# Patient Record
Sex: FEMALE | Race: WHITE | NOT HISPANIC OR LATINO | Employment: OTHER | URBAN - METROPOLITAN AREA
[De-identification: names, ages, dates, MRNs, and addresses within clinical notes are randomized per-mention and may not be internally consistent; named-entity substitution may affect disease eponyms.]

---

## 2017-02-21 ENCOUNTER — OFFICE VISIT - HEALTHEAST (OUTPATIENT)
Dept: FAMILY MEDICINE | Facility: CLINIC | Age: 72
End: 2017-02-21

## 2017-02-21 DIAGNOSIS — J06.9 VIRAL URI WITH COUGH: ICD-10-CM

## 2017-02-28 ENCOUNTER — COMMUNICATION - HEALTHEAST (OUTPATIENT)
Dept: SCHEDULING | Facility: CLINIC | Age: 72
End: 2017-02-28

## 2017-03-09 ENCOUNTER — COMMUNICATION - HEALTHEAST (OUTPATIENT)
Dept: INTERNAL MEDICINE | Facility: CLINIC | Age: 72
End: 2017-03-09

## 2017-03-09 DIAGNOSIS — I10 HTN (HYPERTENSION): ICD-10-CM

## 2017-05-02 ENCOUNTER — COMMUNICATION - HEALTHEAST (OUTPATIENT)
Dept: INTERNAL MEDICINE | Facility: CLINIC | Age: 72
End: 2017-05-02

## 2017-05-23 ENCOUNTER — RECORDS - HEALTHEAST (OUTPATIENT)
Dept: ADMINISTRATIVE | Facility: OTHER | Age: 72
End: 2017-05-23

## 2017-05-24 ENCOUNTER — OFFICE VISIT - HEALTHEAST (OUTPATIENT)
Dept: INTERNAL MEDICINE | Facility: CLINIC | Age: 72
End: 2017-05-24

## 2017-05-24 DIAGNOSIS — E78.5 HYPERLIPIDEMIA: ICD-10-CM

## 2017-05-24 DIAGNOSIS — Z00.00 MEDICARE ANNUAL WELLNESS VISIT, SUBSEQUENT: ICD-10-CM

## 2017-05-24 DIAGNOSIS — Z12.11 SCREENING FOR COLON CANCER: ICD-10-CM

## 2017-05-24 DIAGNOSIS — I10 ESSENTIAL HYPERTENSION: ICD-10-CM

## 2017-05-24 DIAGNOSIS — Z12.31 SCREENING MAMMOGRAM, ENCOUNTER FOR: ICD-10-CM

## 2017-05-24 LAB
CHOLEST SERPL-MCNC: 284 MG/DL
FASTING STATUS PATIENT QL REPORTED: ABNORMAL
HDLC SERPL-MCNC: 58 MG/DL
LDLC SERPL CALC-MCNC: 181 MG/DL
TRIGL SERPL-MCNC: 224 MG/DL

## 2017-05-24 ASSESSMENT — MIFFLIN-ST. JEOR: SCORE: 1275.31

## 2017-05-25 ENCOUNTER — COMMUNICATION - HEALTHEAST (OUTPATIENT)
Dept: INTERNAL MEDICINE | Facility: CLINIC | Age: 72
End: 2017-05-25

## 2017-05-30 ENCOUNTER — COMMUNICATION - HEALTHEAST (OUTPATIENT)
Dept: INTERNAL MEDICINE | Facility: CLINIC | Age: 72
End: 2017-05-30

## 2017-05-30 DIAGNOSIS — I10 HTN (HYPERTENSION): ICD-10-CM

## 2017-06-06 ENCOUNTER — COMMUNICATION - HEALTHEAST (OUTPATIENT)
Dept: INTERNAL MEDICINE | Facility: CLINIC | Age: 72
End: 2017-06-06

## 2017-06-06 DIAGNOSIS — I10 HTN (HYPERTENSION): ICD-10-CM

## 2017-07-27 ENCOUNTER — OFFICE VISIT - HEALTHEAST (OUTPATIENT)
Dept: INTERNAL MEDICINE | Facility: CLINIC | Age: 72
End: 2017-07-27

## 2017-07-27 DIAGNOSIS — M72.2 PLANTAR FASCIITIS: ICD-10-CM

## 2017-07-27 DIAGNOSIS — Z77.22 TOBACCO SMOKE EXPOSURE: ICD-10-CM

## 2017-07-27 DIAGNOSIS — R05.9 COUGH: ICD-10-CM

## 2017-08-24 ENCOUNTER — COMMUNICATION - HEALTHEAST (OUTPATIENT)
Dept: INTERNAL MEDICINE | Facility: CLINIC | Age: 72
End: 2017-08-24

## 2017-10-24 ENCOUNTER — AMBULATORY - HEALTHEAST (OUTPATIENT)
Dept: NURSING | Facility: CLINIC | Age: 72
End: 2017-10-24

## 2018-05-17 ENCOUNTER — COMMUNICATION - HEALTHEAST (OUTPATIENT)
Dept: SCHEDULING | Facility: CLINIC | Age: 73
End: 2018-05-17

## 2018-05-21 ENCOUNTER — COMMUNICATION - HEALTHEAST (OUTPATIENT)
Dept: INTERNAL MEDICINE | Facility: CLINIC | Age: 73
End: 2018-05-21

## 2018-05-21 DIAGNOSIS — I10 HTN (HYPERTENSION): ICD-10-CM

## 2018-06-08 ENCOUNTER — RECORDS - HEALTHEAST (OUTPATIENT)
Dept: ADMINISTRATIVE | Facility: OTHER | Age: 73
End: 2018-06-08

## 2018-08-20 ENCOUNTER — COMMUNICATION - HEALTHEAST (OUTPATIENT)
Dept: INTERNAL MEDICINE | Facility: CLINIC | Age: 73
End: 2018-08-20

## 2018-08-20 DIAGNOSIS — I10 HTN (HYPERTENSION): ICD-10-CM

## 2018-10-13 ENCOUNTER — AMBULATORY - HEALTHEAST (OUTPATIENT)
Dept: NURSING | Facility: CLINIC | Age: 73
End: 2018-10-13

## 2018-10-13 DIAGNOSIS — Z23 FLU VACCINE NEED: ICD-10-CM

## 2018-11-16 ENCOUNTER — COMMUNICATION - HEALTHEAST (OUTPATIENT)
Dept: INTERNAL MEDICINE | Facility: CLINIC | Age: 73
End: 2018-11-16

## 2018-11-16 DIAGNOSIS — I10 HTN (HYPERTENSION): ICD-10-CM

## 2019-02-14 ENCOUNTER — COMMUNICATION - HEALTHEAST (OUTPATIENT)
Dept: INTERNAL MEDICINE | Facility: CLINIC | Age: 74
End: 2019-02-14

## 2019-02-14 DIAGNOSIS — I10 HTN (HYPERTENSION): ICD-10-CM

## 2019-03-18 ENCOUNTER — COMMUNICATION - HEALTHEAST (OUTPATIENT)
Dept: INTERNAL MEDICINE | Facility: CLINIC | Age: 74
End: 2019-03-18

## 2019-03-18 DIAGNOSIS — I10 HTN (HYPERTENSION): ICD-10-CM

## 2019-04-15 ENCOUNTER — COMMUNICATION - HEALTHEAST (OUTPATIENT)
Dept: INTERNAL MEDICINE | Facility: CLINIC | Age: 74
End: 2019-04-15

## 2019-04-15 DIAGNOSIS — I10 HTN (HYPERTENSION): ICD-10-CM

## 2019-05-15 ENCOUNTER — COMMUNICATION - HEALTHEAST (OUTPATIENT)
Dept: INTERNAL MEDICINE | Facility: CLINIC | Age: 74
End: 2019-05-15

## 2019-05-15 DIAGNOSIS — I10 HTN (HYPERTENSION): ICD-10-CM

## 2019-05-24 ENCOUNTER — OFFICE VISIT - HEALTHEAST (OUTPATIENT)
Dept: INTERNAL MEDICINE | Facility: CLINIC | Age: 74
End: 2019-05-24

## 2019-05-24 DIAGNOSIS — Z12.11 SCREEN FOR COLON CANCER: ICD-10-CM

## 2019-05-24 DIAGNOSIS — I10 HTN (HYPERTENSION): ICD-10-CM

## 2019-05-24 DIAGNOSIS — Z12.31 VISIT FOR SCREENING MAMMOGRAM: ICD-10-CM

## 2019-06-11 ENCOUNTER — OFFICE VISIT - HEALTHEAST (OUTPATIENT)
Dept: INTERNAL MEDICINE | Facility: CLINIC | Age: 74
End: 2019-06-11

## 2019-06-11 DIAGNOSIS — E78.5 HYPERLIPIDEMIA, UNSPECIFIED HYPERLIPIDEMIA TYPE: ICD-10-CM

## 2019-06-11 DIAGNOSIS — I10 ESSENTIAL HYPERTENSION: ICD-10-CM

## 2019-06-11 LAB
ALBUMIN SERPL-MCNC: 3.3 G/DL (ref 3.5–5)
ALP SERPL-CCNC: 73 U/L (ref 45–120)
ALT SERPL W P-5'-P-CCNC: <9 U/L (ref 0–45)
ANION GAP SERPL CALCULATED.3IONS-SCNC: 10 MMOL/L (ref 5–18)
AST SERPL W P-5'-P-CCNC: 12 U/L (ref 0–40)
BILIRUB SERPL-MCNC: 1 MG/DL (ref 0–1)
BUN SERPL-MCNC: 27 MG/DL (ref 8–28)
CALCIUM SERPL-MCNC: 10 MG/DL (ref 8.5–10.5)
CHLORIDE BLD-SCNC: 103 MMOL/L (ref 98–107)
CHOLEST SERPL-MCNC: 257 MG/DL
CO2 SERPL-SCNC: 29 MMOL/L (ref 22–31)
CREAT SERPL-MCNC: 0.83 MG/DL (ref 0.6–1.1)
ERYTHROCYTE [DISTWIDTH] IN BLOOD BY AUTOMATED COUNT: 12.5 % (ref 11–14.5)
FASTING STATUS PATIENT QL REPORTED: YES
GFR SERPL CREATININE-BSD FRML MDRD: >60 ML/MIN/1.73M2
GLUCOSE BLD-MCNC: 84 MG/DL (ref 70–125)
HCT VFR BLD AUTO: 40.4 % (ref 35–47)
HDLC SERPL-MCNC: 56 MG/DL
HGB BLD-MCNC: 13.7 G/DL (ref 12–16)
LDLC SERPL CALC-MCNC: 176 MG/DL
MCH RBC QN AUTO: 29.7 PG (ref 27–34)
MCHC RBC AUTO-ENTMCNC: 33.9 G/DL (ref 32–36)
MCV RBC AUTO: 87 FL (ref 80–100)
PLATELET # BLD AUTO: 283 THOU/UL (ref 140–440)
PMV BLD AUTO: 6.1 FL (ref 7–10)
POTASSIUM BLD-SCNC: 3.5 MMOL/L (ref 3.5–5)
PROT SERPL-MCNC: 7.2 G/DL (ref 6–8)
RBC # BLD AUTO: 4.62 MILL/UL (ref 3.8–5.4)
SODIUM SERPL-SCNC: 142 MMOL/L (ref 136–145)
TRIGL SERPL-MCNC: 125 MG/DL
WBC: 5.1 THOU/UL (ref 4–11)

## 2019-06-12 ENCOUNTER — COMMUNICATION - HEALTHEAST (OUTPATIENT)
Dept: INTERNAL MEDICINE | Facility: CLINIC | Age: 74
End: 2019-06-12

## 2019-06-12 DIAGNOSIS — I10 HTN (HYPERTENSION): ICD-10-CM

## 2019-07-24 ENCOUNTER — OFFICE VISIT - HEALTHEAST (OUTPATIENT)
Dept: INTERNAL MEDICINE | Facility: CLINIC | Age: 74
End: 2019-07-24

## 2019-07-24 DIAGNOSIS — I10 ESSENTIAL HYPERTENSION: ICD-10-CM

## 2019-07-24 DIAGNOSIS — E78.5 HYPERLIPIDEMIA, UNSPECIFIED HYPERLIPIDEMIA TYPE: ICD-10-CM

## 2019-07-25 ENCOUNTER — COMMUNICATION - HEALTHEAST (OUTPATIENT)
Dept: INTERNAL MEDICINE | Facility: CLINIC | Age: 74
End: 2019-07-25

## 2019-08-21 ENCOUNTER — COMMUNICATION - HEALTHEAST (OUTPATIENT)
Dept: INTERNAL MEDICINE | Facility: CLINIC | Age: 74
End: 2019-08-21

## 2019-10-15 ENCOUNTER — OFFICE VISIT - HEALTHEAST (OUTPATIENT)
Dept: FAMILY MEDICINE | Facility: CLINIC | Age: 74
End: 2019-10-15

## 2019-10-15 DIAGNOSIS — I10 ESSENTIAL HYPERTENSION: ICD-10-CM

## 2019-10-15 DIAGNOSIS — Z00.00 ROUTINE ADULT HEALTH MAINTENANCE: ICD-10-CM

## 2019-10-15 DIAGNOSIS — M48.07 SPINAL STENOSIS OF LUMBOSACRAL REGION: ICD-10-CM

## 2019-10-15 DIAGNOSIS — E78.2 MIXED HYPERLIPIDEMIA: ICD-10-CM

## 2019-10-15 ASSESSMENT — MIFFLIN-ST. JEOR: SCORE: 1226.27

## 2020-06-10 ENCOUNTER — COMMUNICATION - HEALTHEAST (OUTPATIENT)
Dept: INTERNAL MEDICINE | Facility: CLINIC | Age: 75
End: 2020-06-10

## 2020-06-10 DIAGNOSIS — I10 HTN (HYPERTENSION): ICD-10-CM

## 2020-06-18 ENCOUNTER — OFFICE VISIT - HEALTHEAST (OUTPATIENT)
Dept: FAMILY MEDICINE | Facility: CLINIC | Age: 75
End: 2020-06-18

## 2020-06-18 DIAGNOSIS — E78.2 MIXED HYPERLIPIDEMIA: ICD-10-CM

## 2020-06-18 DIAGNOSIS — I10 HTN (HYPERTENSION): ICD-10-CM

## 2021-02-05 ENCOUNTER — AMBULATORY - HEALTHEAST (OUTPATIENT)
Dept: NURSING | Facility: CLINIC | Age: 76
End: 2021-02-05

## 2021-02-26 ENCOUNTER — AMBULATORY - HEALTHEAST (OUTPATIENT)
Dept: NURSING | Facility: CLINIC | Age: 76
End: 2021-02-26

## 2021-03-03 ENCOUNTER — COMMUNICATION - HEALTHEAST (OUTPATIENT)
Dept: FAMILY MEDICINE | Facility: CLINIC | Age: 76
End: 2021-03-03

## 2021-03-03 DIAGNOSIS — I10 HTN (HYPERTENSION): ICD-10-CM

## 2021-03-03 DIAGNOSIS — E78.2 MIXED HYPERLIPIDEMIA: ICD-10-CM

## 2021-05-03 ENCOUNTER — OFFICE VISIT - HEALTHEAST (OUTPATIENT)
Dept: FAMILY MEDICINE | Facility: CLINIC | Age: 76
End: 2021-05-03

## 2021-05-03 DIAGNOSIS — Z13.6 SCREENING FOR CARDIOVASCULAR CONDITION: ICD-10-CM

## 2021-05-03 DIAGNOSIS — I10 ESSENTIAL HYPERTENSION: ICD-10-CM

## 2021-05-03 DIAGNOSIS — E78.2 MIXED HYPERLIPIDEMIA: ICD-10-CM

## 2021-05-03 LAB
ALBUMIN SERPL-MCNC: 3.5 G/DL (ref 3.5–5)
ALP SERPL-CCNC: 84 U/L (ref 45–120)
ALT SERPL W P-5'-P-CCNC: 24 U/L (ref 0–45)
ANION GAP SERPL CALCULATED.3IONS-SCNC: 10 MMOL/L (ref 5–18)
AST SERPL W P-5'-P-CCNC: 17 U/L (ref 0–40)
BILIRUB SERPL-MCNC: 1.3 MG/DL (ref 0–1)
BUN SERPL-MCNC: 24 MG/DL (ref 8–28)
CALCIUM SERPL-MCNC: 9.6 MG/DL (ref 8.5–10.5)
CHLORIDE BLD-SCNC: 101 MMOL/L (ref 98–107)
CHOLEST SERPL-MCNC: 172 MG/DL
CO2 SERPL-SCNC: 29 MMOL/L (ref 22–31)
CREAT SERPL-MCNC: 0.77 MG/DL (ref 0.6–1.1)
ERYTHROCYTE [DISTWIDTH] IN BLOOD BY AUTOMATED COUNT: 13 % (ref 11–14.5)
FASTING STATUS PATIENT QL REPORTED: YES
GFR SERPL CREATININE-BSD FRML MDRD: >60 ML/MIN/1.73M2
GLUCOSE BLD-MCNC: 84 MG/DL (ref 70–125)
HCT VFR BLD AUTO: 40.3 % (ref 35–47)
HDLC SERPL-MCNC: 61 MG/DL
HGB BLD-MCNC: 13.3 G/DL (ref 12–16)
LDLC SERPL CALC-MCNC: 93 MG/DL
MCH RBC QN AUTO: 29.7 PG (ref 27–34)
MCHC RBC AUTO-ENTMCNC: 33 G/DL (ref 32–36)
MCV RBC AUTO: 90 FL (ref 80–100)
PLATELET # BLD AUTO: 282 THOU/UL (ref 140–440)
PMV BLD AUTO: 8.9 FL (ref 7–10)
POTASSIUM BLD-SCNC: 3.5 MMOL/L (ref 3.5–5)
PROT SERPL-MCNC: 7.2 G/DL (ref 6–8)
RBC # BLD AUTO: 4.48 MILL/UL (ref 3.8–5.4)
SODIUM SERPL-SCNC: 140 MMOL/L (ref 136–145)
TRIGL SERPL-MCNC: 90 MG/DL
WBC: 6.4 THOU/UL (ref 4–11)

## 2021-05-03 ASSESSMENT — MIFFLIN-ST. JEOR: SCORE: 1252.63

## 2021-05-07 ENCOUNTER — COMMUNICATION - HEALTHEAST (OUTPATIENT)
Dept: FAMILY MEDICINE | Facility: CLINIC | Age: 76
End: 2021-05-07

## 2021-05-26 ENCOUNTER — RECORDS - HEALTHEAST (OUTPATIENT)
Dept: ADMINISTRATIVE | Facility: CLINIC | Age: 76
End: 2021-05-26

## 2021-05-26 VITALS — DIASTOLIC BLOOD PRESSURE: 84 MMHG | SYSTOLIC BLOOD PRESSURE: 144 MMHG | HEART RATE: 69 BPM

## 2021-05-27 ENCOUNTER — RECORDS - HEALTHEAST (OUTPATIENT)
Dept: ADMINISTRATIVE | Facility: CLINIC | Age: 76
End: 2021-05-27

## 2021-05-27 ENCOUNTER — COMMUNICATION - HEALTHEAST (OUTPATIENT)
Dept: FAMILY MEDICINE | Facility: CLINIC | Age: 76
End: 2021-05-27

## 2021-05-27 VITALS
WEIGHT: 173.19 LBS | SYSTOLIC BLOOD PRESSURE: 150 MMHG | OXYGEN SATURATION: 97 % | DIASTOLIC BLOOD PRESSURE: 92 MMHG | BODY MASS INDEX: 29.57 KG/M2 | HEIGHT: 64 IN | HEART RATE: 82 BPM

## 2021-05-27 NOTE — TELEPHONE ENCOUNTER
Former patient of selam Carrillo & has not established care with another provider.  Please assign refill request to covering provider per Clinic standard process.      RN cannot approve Refill Request    RN can NOT refill this medication PCP messaged that patient is overdue for Labs and Office Visit.       Adrienne Ansari, Care Connection Triage/Med Refill 4/17/2019    Requested Prescriptions   Pending Prescriptions Disp Refills     lisinopril (PRINIVIL,ZESTRIL) 40 MG tablet [Pharmacy Med Name: LISINOPRIL 40MG TABLETS] 30 tablet 0     Sig: TAKE 1 TABLET BY MOUTH DAILY       Ace Inhibitors Refill Protocol Failed - 4/15/2019  1:26 PM        Failed - PCP or prescribing provider visit in past 12 months       Last office visit with prescriber/PCP: Visit date not found OR same dept: Visit date not found OR same specialty: 7/27/2017 Selam Carrillo MD  Last physical: Visit date not found Last MTM visit: Visit date not found   Next visit within 3 mo: Visit date not found  Next physical within 3 mo: Visit date not found  Prescriber OR PCP: Jitendra Ovalles MD  Last diagnosis associated with med order: 1. HTN (hypertension)  - lisinopril (PRINIVIL,ZESTRIL) 40 MG tablet [Pharmacy Med Name: LISINOPRIL 40MG TABLETS]; TAKE 1 TABLET BY MOUTH DAILY  Dispense: 30 tablet; Refill: 0  - indapamide (LOZOL) 2.5 MG tablet [Pharmacy Med Name: INDAPAMIDE 2.5MG TABLETS]; TAKE 1 TABLET BY MOUTH DAILY  Dispense: 30 tablet; Refill: 0    If protocol passes may refill for 12 months if within 3 months of last provider visit (or a total of 15 months).             Failed - Serum Potassium in past 12 months     No results found for: LN-POTASSIUM          Failed - Blood pressure filed in past 12 months     BP Readings from Last 1 Encounters:   07/27/17 144/90             Failed - Serum Creatinine in past 12 months     Creatinine   Date Value Ref Range Status   05/24/2017 0.80 0.60 - 1.10 mg/dL Final             indapamide (LOZOL) 2.5 MG tablet [Pharmacy  Med Name: INDAPAMIDE 2.5MG TABLETS] 30 tablet 0     Sig: TAKE 1 TABLET BY MOUTH DAILY       Diuretics/Combination Diuretics Refill Protocol  Failed - 4/15/2019  1:26 PM        Failed - Visit with PCP or prescribing provider visit in past 12 months     Last office visit with prescriber/PCP: Visit date not found OR same dept: Visit date not found OR same specialty: 7/27/2017 Selam Carrillo MD  Last physical: Visit date not found Last MTM visit: Visit date not found   Next visit within 3 mo: Visit date not found  Next physical within 3 mo: Visit date not found  Prescriber OR PCP: Jitendra Ovalles MD  Last diagnosis associated with med order: 1. HTN (hypertension)  - lisinopril (PRINIVIL,ZESTRIL) 40 MG tablet [Pharmacy Med Name: LISINOPRIL 40MG TABLETS]; TAKE 1 TABLET BY MOUTH DAILY  Dispense: 30 tablet; Refill: 0  - indapamide (LOZOL) 2.5 MG tablet [Pharmacy Med Name: INDAPAMIDE 2.5MG TABLETS]; TAKE 1 TABLET BY MOUTH DAILY  Dispense: 30 tablet; Refill: 0    If protocol passes may refill for 12 months if within 3 months of last provider visit (or a total of 15 months).             Failed - Serum Potassium in past 12 months      No results found for: LN-POTASSIUM          Failed - Serum Sodium in past 12 months      No results found for: LN-SODIUM          Failed - Blood pressure on file in past 12 months     BP Readings from Last 1 Encounters:   07/27/17 144/90             Failed - Serum Creatinine in past 12 months      Creatinine   Date Value Ref Range Status   05/24/2017 0.80 0.60 - 1.10 mg/dL Final

## 2021-05-27 NOTE — TELEPHONE ENCOUNTER
Palmer call from patient received asking on the status of this refill request. Former Dr. Selam Carrillo patient. Please advise.     (Patient notes OK to leave a detailed message at: [phone].)      Florina Rangel [facility]  12:53 PM  4/18/2019

## 2021-05-27 NOTE — TELEPHONE ENCOUNTER
Patient informed about authorization of her refills and need to make an appointment for establishment of care with a new provider in order to receive further refills. Expressed understanding.       Florina Rangel [facility]  3:37 PM  4/18/2019

## 2021-05-28 ENCOUNTER — RECORDS - HEALTHEAST (OUTPATIENT)
Dept: ADMINISTRATIVE | Facility: CLINIC | Age: 76
End: 2021-05-28

## 2021-05-28 NOTE — TELEPHONE ENCOUNTER
Patient has not been seen in over one year. Will need to establish care with a new physician now that Dr. Carrillo is no longer her. Please help schedule. Offer other locations as well. We may be able to get a bridged refill once scheduled.  Nay Bailey CMA ............... 8:17 AM, 05/16/19

## 2021-05-28 NOTE — TELEPHONE ENCOUNTER
Former patient of Dr. Selam Carrillo & has not established care with another provider.  Please assign refill request to covering provider per Clinic standard process.    RN cannot approve Refill Request    RN can NOT refill this medication overdue for office visits and/or labs.    Quentin Gan, [facility] [place] Connection Triage/Med Refill 5/16/2019    Requested Prescriptions   Pending Prescriptions Disp Refills     lisinopril (PRINIVIL,ZESTRIL) 40 MG tablet [Pharmacy Med Name: LISINOPRIL 40MG TABLETS] 30 tablet 0     Sig: TAKE 1 TABLET BY MOUTH EVERY DAY       Ace Inhibitors Refill Protocol Failed - 5/15/2019 10:16 AM        Failed - PCP or prescribing provider visit in past 12 months       Last office visit with prescriber/PCP: Visit date not found OR same dept: Visit date not found OR same specialty: 7/27/2017 Selam Carrillo MD  Last physical: Visit date not found Last MTM visit: Visit date not found   Next visit within 3 mo: Visit date not found  Next physical within 3 mo: Visit date not found  Prescriber OR PCP: Jitendra Ovalles MD  Last diagnosis associated with med order: 1. HTN (hypertension)  - lisinopril (PRINIVIL,ZESTRIL) 40 MG tablet [Pharmacy Med Name: LISINOPRIL 40MG TABLETS]; TAKE 1 TABLET BY MOUTH EVERY DAY  Dispense: 30 tablet; Refill: 0  - indapamide (LOZOL) 2.5 MG tablet [Pharmacy Med Name: INDAPAMIDE 2.5MG TABLETS]; TAKE 1 TABLET BY MOUTH EVERY DAY  Dispense: 30 tablet; Refill: 0    If protocol passes may refill for 12 months if within 3 months of last provider visit (or a total of 15 months).             Failed - Serum Potassium in past 12 months     No results found for: LN-POTASSIUM          Failed - Blood pressure filed in past 12 months     BP Readings from Last 1 Encounters:   07/27/17 144/90             Failed - Serum Creatinine in past 12 months     Creatinine   Date Value Ref Range Status   05/24/2017 0.80 0.60 - 1.10 mg/dL Final             indapamide (LOZOL) 2.5 MG tablet [Pharmacy Med Name:  INDAPAMIDE 2.5MG TABLETS] 30 tablet 0     Sig: TAKE 1 TABLET BY MOUTH EVERY DAY       Diuretics/Combination Diuretics Refill Protocol  Failed - 5/15/2019 10:16 AM        Failed - Visit with PCP or prescribing provider visit in past 12 months     Last office visit with prescriber/PCP: Visit date not found OR same dept: Visit date not found OR same specialty: 7/27/2017 Selam Carrillo MD  Last physical: Visit date not found Last MTM visit: Visit date not found   Next visit within 3 mo: Visit date not found  Next physical within 3 mo: Visit date not found  Prescriber OR PCP: Jitendra Ovalles MD  Last diagnosis associated with med order: 1. HTN (hypertension)  - lisinopril (PRINIVIL,ZESTRIL) 40 MG tablet [Pharmacy Med Name: LISINOPRIL 40MG TABLETS]; TAKE 1 TABLET BY MOUTH EVERY DAY  Dispense: 30 tablet; Refill: 0  - indapamide (LOZOL) 2.5 MG tablet [Pharmacy Med Name: INDAPAMIDE 2.5MG TABLETS]; TAKE 1 TABLET BY MOUTH EVERY DAY  Dispense: 30 tablet; Refill: 0    If protocol passes may refill for 12 months if within 3 months of last provider visit (or a total of 15 months).             Failed - Serum Potassium in past 12 months      No results found for: LN-POTASSIUM          Failed - Serum Sodium in past 12 months      No results found for: LN-SODIUM          Failed - Blood pressure on file in past 12 months     BP Readings from Last 1 Encounters:   07/27/17 144/90             Failed - Serum Creatinine in past 12 months      Creatinine   Date Value Ref Range Status   05/24/2017 0.80 0.60 - 1.10 mg/dL Final

## 2021-05-28 NOTE — TELEPHONE ENCOUNTER
Medication Question or Clarification  Who is calling: Patient  What medication are you calling about? (include dose and sig)   indapamide (LOZOL) 2.5 MG tablet 30 tablet 0 4/18/2019     Sig: TAKE 1 TABLET BY MOUTH DAILY      lisinopril (PRINIVIL,ZESTRIL) 40 MG tablet 30 tablet 0 4/18/2019     Sig: TAKE 1 TABLET BY MOUTH DAILY    Sent to pharmacy as: lisinopril (PRINIVIL,ZESTRIL) 40 MG tablet          Who prescribed the medication?: Dr Ovalles  What is your question/concern?: Patient states she has an appointment with Farhad Sandhu NP on 5/24/2019 due to she could not get in to see Dr Ovalles until 9/24/2019.  Patient is requesting a fill until appointment with Farhad Sandhu NP plus 2 days after, as she is out of these medications.  Please address today.  Pharmacy: [facility] to leave a detailed message?: Yes  Site CMT - Please call the pharmacy to obtain any additional needed information.

## 2021-05-28 NOTE — TELEPHONE ENCOUNTER
Patient Returning Call  Reason for call:  Message from clinic  Information relayed to patient:Claire, Florina ALMAGUER, [facility]      3:36 PM   Note      Patient informed about authorization of her refills and need to make an appointment for establishment of care with a new provider in order to receive further refills. Expressed understanding.              Patient has additional questions:  Yes  If YES, what are your questions/concerns:  Patient can not get an appointment to establish care with Dr. Ovalles until September.  She is asking if her medication will be bridged until September.    In speaking with patient, she has not seen a doctor regarding her hypertension since July, 2017.  I suggested she see another [facility] provider to have her blood pressure evaluated and refills while waiting to establish care with Dr. Ovalles.  She is not interested and wants to speak to Dr. Ovalles's assistant.  Okay to leave a detailed message?: Yes

## 2021-05-28 NOTE — TELEPHONE ENCOUNTER
Helped patient schedule a med check appointment with Uriel and and EC appointment with Dr. Ovalles. Closing encounter.  Nay Bailey CMA ............... 10:52 AM, 05/14/19

## 2021-05-29 ENCOUNTER — RECORDS - HEALTHEAST (OUTPATIENT)
Dept: ADMINISTRATIVE | Facility: CLINIC | Age: 76
End: 2021-05-29

## 2021-05-29 NOTE — TELEPHONE ENCOUNTER
Patient Returning Call  Reason for call:  Patient returning call to the clinic.  Information relayed to patient:  Yes as instructed and patient agrees with plan. Patient stated I am only wanting to start the blood pressure medication for now and will discuss the cholesterol medication when I return in 1 month.   Patient has additional questions:  No  If YES, what are your questions/concerns:  none  Okay to leave a detailed message?: Yes

## 2021-05-29 NOTE — PATIENT INSTRUCTIONS - HE
Your blood pressure is too high.  In general, we want your blood pressure to be less than 140/90.    Start checking your blood pressures at home every day.  Write these measurements down and bring them with you to an appointment with me in 2 weeks.    I sent in refills of your blood pressure medication.    Try and reduce the amount of sodium that you are consuming.    If your blood pressure continues to be elevated, we will consider adding a medication called amlodipine.

## 2021-05-29 NOTE — PROGRESS NOTES
Clinic Note    Assessment:     Assessment and Plan:  1. HTN (hypertension)  Uncontrolled.  She is using indapamide 2.5 mg, and lisinopril 40 mg.  We had a discussion about increasing her thiazide diuretic to 5 mg daily but patient states that this was attempted by a PCP about 5 years ago and was not tolerated well and led to electrolyte disturbances.  She will come back and see me in 2 weeks for blood pressure check.  Told her that if her BP remains elevated, we could consider amlodipine.    2. Visit for screening mammogram  - Mammo Screening Bilateral; Future    3. Screen for colon cancer  - Ambulatory referral for Colonoscopy       Patient Instructions   Your blood pressure is too high.  In general, we want your blood pressure to be less than 140/90.    Start checking your blood pressures at home every day.  Write these measurements down and bring them with you to an appointment with me in 2 weeks.    I sent in refills of your blood pressure medication.    Try and reduce the amount of sodium that you are consuming.    If your blood pressure continues to be elevated, we will consider adding a medication called amlodipine.    Return in about 2 weeks (around 6/7/2019).         Subjective:      Patient comes to the clinic today for follow-up of her hypertension.    She is currently using lisinopril 40 mg, and indapamide 2.5 mg.  She has not been seen by her prior PCP, Dr. Selam Carrillo since 2017.    She does not routinely check her blood pressures at home.  She tries her best to limit her sodium intake but does not pay too close attention to her diet.    She is been dealing with some sciatica type discomfort that limits her physical activity somewhat.    Today, her blood pressure is 150/80.  On recheck, it is essentially the same.  She is very hesitant about making any changes to her blood pressure medications.    The following portions of the patient's history were reviewed and updated as appropriate: Allergies,  medications, problems, prior note.    Review of Systems:    Review is otherwise negative except for what is mentioned above.     Social Hx:    Social History     Tobacco Use   Smoking Status Never Smoker   Smokeless Tobacco Never Used         Objective:     Vitals:    05/24/19 1347   BP: 150/80   Pulse: 72   Weight: 174 lb (78.9 kg)       Exam:    General: No apparent distress. Calm. Alert and Oriented X3. Pt behavior is appropriate.      Patient Active Problem List   Diagnosis     Hypertension     Malignant Melanoma Of The Arm     Basal Cell Carcinoma Of The Skin     Hyperlipidemia     Hypokalemia     Polyps Of The Sigmoid Colon     Current Outpatient Medications   Medication Sig Dispense Refill     coenzyme Q10 100 mg capsule Take 100 mg by mouth daily.       FOLIC ACID/MULTIVIT-MIN/LUTEIN (CENTRUM SILVER ORAL) Take by mouth.       indapamide (LOZOL) 2.5 MG tablet Take 1 tablet (2.5 mg total) by mouth daily. 90 tablet 3     lisinopril (PRINIVIL,ZESTRIL) 40 MG tablet Take 1 tablet (40 mg total) by mouth daily. 90 tablet 3     OMEGA-3S/DHA/EPA/FISH OIL (OMEGA 3 ORAL) Take by mouth.       No current facility-administered medications for this visit.          Farhad Sandhu (Rob), [facility]    5/24/2019

## 2021-05-29 NOTE — PATIENT INSTRUCTIONS - HE
Your blood pressure is too high.  Remember, we want your blood pressure to be consistently less than 140/90.    I sent in a prescription for amlodipine 2.5 mg.  I want you to take this daily with the rest of your BP meds.    We will recheck kidney, liver, electrolyte labs today.    Recheck blood counts today.    Recheck cholesterol labs today.    I will mail results to you.    Follow-up with me in 1 month for BP recheck.  Bring your log of BP measurements with you to that appointment.  Bring your blood pressure cuff with you to that appointment so we can check its accuracy.    Try to work on diet and exercise to improve cholesterol numbers and blood pressure.

## 2021-05-29 NOTE — TELEPHONE ENCOUNTER
Left voicemail for patient to return call to clinic. When patient returns call, please give them below message.    Nay Bailey CMA ............... 8:53 AM, 06/12/19

## 2021-05-29 NOTE — PROGRESS NOTES
Clinic Note    Assessment:     Assessment and Plan:  1. Hypertension  Uncontrolled.  We will add amlodipine 2.5 mg to her regimen and have her follow-up with me in 1 month for recheck.  See patient instructions below for plan of care.    - Comprehensive Metabolic Panel  - HM2(CBC w/o Differential)  - amLODIPine (NORVASC) 2.5 MG tablet; Take 1 tablet (2.5 mg total) by mouth daily.  Dispense: 90 tablet; Refill: 0    2. Hyperlipidemia, unspecified hyperlipidemia type  She is fasting today.  She is done well with atorvastatin 20 mg in the past.  If her labs indicate poor control, she is agreeable to restart her Lipitor.    - Lipid Profile       Patient Instructions   Your blood pressure is too high.  Remember, we want your blood pressure to be consistently less than 140/90.    I sent in a prescription for amlodipine 2.5 mg.  I want you to take this daily with the rest of your BP meds.    We will recheck kidney, liver, electrolyte labs today.    Recheck blood counts today.    Recheck cholesterol labs today.    I will mail results to you.    Follow-up with me in 1 month for BP recheck.  Bring your log of BP measurements with you to that appointment.  Bring your blood pressure cuff with you to that appointment so we can check its accuracy.    Try to work on diet and exercise to improve cholesterol numbers and blood pressure.    Return in about 1 month (around 7/11/2019).         Subjective:      Patient comes to clinic today for follow-up of her hypertension.    I saw her approximately 1 month ago.  At that time, her blood pressure was 150/80.  She was extremely hesitant about making any changes to her blood pressure regimen.  She had told me that she experienced some electrolyte disturbances with increases to her indapamide.    She had been lost to her PCP for follow-up.  She has a new [facility] care appointment with Dr. Jitendra Ovalles this coming September.    History of hyperlipidemia.  Had been using Lipitor 20 mg daily  with success.  No myalgias or arthralgias.  She became concerned with reports of memory loss while on the medication and so decided to stop.    She admits that exercise is been difficult.  She does have a history of sciatica type low back pain.    The following portions of the patient's history were reviewed and updated as appropriate: Allergies, medications, problems, prior note.    Review of Systems:    Review is otherwise negative except for what is mentioned above.     Social Hx:    Social History     Tobacco Use   Smoking Status Never Smoker   Smokeless Tobacco Never Used         Objective:     Vitals:    06/11/19 1054   BP: 170/80   Pulse: 72   Weight: 174 lb (78.9 kg)       Exam:    General: No apparent distress. Calm. Alert and Oriented X3. Pt behavior is appropriate.  Chest/Lungs: Normal chest wall, clear to auscultation, normal respiratory effort and rate.   Heart/Pulses: Regular rate and rhythm, strong and equal radial pulses, no murmurs. Capillary refill <2 seconds. No edema.       Patient Active Problem List   Diagnosis     Hypertension     Malignant Melanoma Of The Arm     Basal Cell Carcinoma Of The Skin     Hyperlipidemia     Hypokalemia     Polyps Of The Sigmoid Colon     Current Outpatient Medications   Medication Sig Dispense Refill     coenzyme Q10 100 mg capsule Take 100 mg by mouth daily.       FOLIC ACID/MULTIVIT-MIN/LUTEIN (CENTRUM SILVER ORAL) Take by mouth.       indapamide (LOZOL) 2.5 MG tablet Take 1 tablet (2.5 mg total) by mouth daily. 90 tablet 3     lisinopril (PRINIVIL,ZESTRIL) 40 MG tablet Take 1 tablet (40 mg total) by mouth daily. 90 tablet 3     OMEGA-3S/DHA/EPA/FISH OIL (OMEGA 3 ORAL) Take by mouth.       amLODIPine (NORVASC) 2.5 MG tablet Take 1 tablet (2.5 mg total) by mouth daily. 90 tablet 0     No current facility-administered medications for this visit.              Farhad Sandhu (Rob), [facility]    6/11/2019

## 2021-05-29 NOTE — TELEPHONE ENCOUNTER
----- Message from Farhad Sandhu CNP sent at 6/12/2019  7:22 AM CDT -----  Please call the patient and let her know that her cholesterol labs are still too high.  Because of her history of high blood pressure, I would recommend that she restart her atorvastatin cholesterol medication.  I can either send in the prescription for her now, or we can continue our conversations about treatment when I see her back in 2 weeks.

## 2021-05-30 ENCOUNTER — RECORDS - HEALTHEAST (OUTPATIENT)
Dept: ADMINISTRATIVE | Facility: CLINIC | Age: 76
End: 2021-05-30

## 2021-05-30 VITALS — BODY MASS INDEX: 33.05 KG/M2 | WEIGHT: 185.1 LBS

## 2021-05-30 NOTE — TELEPHONE ENCOUNTER
Patient decided not to start on amlodipine.  Was taken off the medication list.  Discuss blood pressure control at next visit.

## 2021-05-30 NOTE — PATIENT INSTRUCTIONS - HE
You can hold off on starting the amlodipine blood pressure thompson Ovalles in September.    Okay to work on diet and exercise to lower cholesterol levels.  I would like to see your LDL cholesterol less than 130.    I will print to some materials on how to lower your cholesterol with diet and exercise.    Continue checking your blood pressures at home.  Okay to do it every other day.  Write these measurements down and bring them with you to your appointment with Dr. Jitendra Ovalles in September.    Consider coronary artery calcium test to evaluate for potential coronary artery disease.  This test is about $100 and is done at [facility].

## 2021-05-30 NOTE — TELEPHONE ENCOUNTER
Medication Question or Clarification  Who is calling: Patient  What medication are you calling about? (include dose and sig)   Disp Refills Start End     amLODIPine (NORVASC) 2.5 MG tablet 90 tablet 0 6/11/2019     Sig - Route: Take 1 tablet (2.5 mg total) by mouth daily. - Oral    Sent to pharmacy as: amLODIPine (NORVASC) 2.5 MG tablet    E-Prescribing Status: Receipt confirmed by pharmacy (6/11/2019 11:24 AM CDT)      Who prescribed the medication?: Farhad Sandhu CNP  What is your question/concern?: Patient stated she would like the amlodipine taken off of her medication list because is not on it and has never taken it. Patient stated she will talk to Jitendra Ovalles MD about whether or not she will take it, in September.  Pharmacy: n/a  Okay to leave a detailed message?: Yes  Site CMT - Please call the pharmacy to obtain any additional needed information.

## 2021-05-30 NOTE — PROGRESS NOTES
Clinic Note    Assessment:     Assessment and Plan:  1. Hypertension  I suspect a element of whitecoat hypertension.  Her blood pressure cuff appears to be accurate.  Her home measurements tend to run in the 130s over 80s range.  Today, her blood pressure is 172/92.  She admits to being nervous in the exam room today.  She has not taken her amlodipine medication as I originally instructed 1 month ago.  I will have her continue to monitor her blood pressure at home, and follow-up for her establish care visit in September to discuss her medications.    2. Hyperlipidemia, unspecified hyperlipidemia type  She has not yet restarted her atorvastatin 20 mg.  She has not taken it in about 15 years.  She wants to work hard on diet and exercise before rechecking her cholesterol levels at her establish care visit in September.  I offered her CAC testing today and she says she will take time to think about it.       Patient Instructions   You can hold off on starting the amlodipine blood pressure thompson Ovalles in September.    Okay to work on diet and exercise to lower cholesterol levels.  I would like to see your LDL cholesterol less than 130.    I will print to some materials on how to lower your cholesterol with diet and exercise.    Continue checking your blood pressures at home.  Okay to do it every other day.  Write these measurements down and bring them with you to your appointment with Dr. Jitendra Ovalles in September.    Consider coronary artery calcium test to evaluate for potential coronary artery disease.  This test is about $100 and is done at [facility].    Return in about 2 months (around 9/24/2019).         Subjective:      Patient comes the clinic today for follow-up.    I saw her 1 month ago.  At that time, her blood pressure was 170/80.  I recommended that she start amlodipine 2.5 mg, in addition to her lisinopril 40 mg, and indapamide 2.5 mg.    I also recommended that she restart her atorvastatin 20  mg.    Today, patient states that she has not started either of the medications.    She has been working hard on diet and exercise.  Eating lots of fruits and vegetables.  Trying to decrease sodium intake.  Trying to increase physical activity.    She brings a log of blood pressures with her to her appointment today.  She has a relatively new [employer] upper arm blood pressure cuff, and is been checking daily.  Most measurements are surprisingly normal with many in the 120s to upper 130s over 80s range.  A few are in the 140s and 150 systolic range.    She has lost about 3 pounds since I last saw her.    She read about the side effects with the amlodipine; this scared her into avoiding the medication.  LDL cholesterol has been in the 180s historically but she was not taking her Lipitor in the past due to side effects she had read about regarding memory.    The following portions of the patient's history were reviewed and updated as appropriate: Allergies, medications, problems, prior note.    Review of Systems:    Review is otherwise negative except for what is mentioned above.     Social Hx:    Social History     Tobacco Use   Smoking Status Never Smoker   Smokeless Tobacco Never Used         Objective:     Vitals:    07/24/19 1244   BP: (!) 172/92   Patient Site: Left Arm   Patient Position: Sitting   Cuff Size: Adult Regular   Pulse: 84   Weight: 171 lb 6.4 oz (77.7 kg)       Exam:    General: No apparent distress. Calm. Alert and Oriented X3. Pt behavior is appropriate.      Patient Active Problem List   Diagnosis     Hypertension     Malignant Melanoma Of The Arm     Basal Cell Carcinoma Of The Skin     Hyperlipidemia     Hypokalemia     Polyps Of The Sigmoid Colon     Current Outpatient Medications   Medication Sig Dispense Refill     coenzyme Q10 100 mg capsule Take 100 mg by mouth daily.       FOLIC ACID/MULTIVIT-MIN/LUTEIN (CENTRUM SILVER ORAL) Take by mouth.       indapamide (LOZOL) 2.5 MG tablet Take 1 tablet  (2.5 mg total) by mouth daily. 90 tablet 3     lisinopril (PRINIVIL,ZESTRIL) 40 MG tablet Take 1 tablet (40 mg total) by mouth daily. 90 tablet 3     OMEGA-3S/DHA/EPA/FISH OIL (OMEGA 3 ORAL) Take by mouth.       amLODIPine (NORVASC) 2.5 MG tablet Take 1 tablet (2.5 mg total) by mouth daily. 90 tablet 0     No current facility-administered medications for this visit.            Farhad Sandhu CNP (Rob)    7/24/2019

## 2021-05-30 NOTE — TELEPHONE ENCOUNTER
Can you please remove this medication? I did flag for removal.  Nay Bailey CMA ............... 10:34 AM, 07/25/19

## 2021-05-31 VITALS — WEIGHT: 176.38 LBS | BODY MASS INDEX: 30.75 KG/M2

## 2021-05-31 VITALS — WEIGHT: 178.19 LBS | BODY MASS INDEX: 30.42 KG/M2 | HEIGHT: 64 IN

## 2021-06-01 ENCOUNTER — RECORDS - HEALTHEAST (OUTPATIENT)
Dept: ADMINISTRATIVE | Facility: CLINIC | Age: 76
End: 2021-06-01

## 2021-06-02 ENCOUNTER — RECORDS - HEALTHEAST (OUTPATIENT)
Dept: ADMINISTRATIVE | Facility: OTHER | Age: 76
End: 2021-06-02

## 2021-06-02 DIAGNOSIS — E78.2 MIXED HYPERLIPIDEMIA: ICD-10-CM

## 2021-06-02 DIAGNOSIS — I10 HTN (HYPERTENSION): ICD-10-CM

## 2021-06-02 RX ORDER — INDAPAMIDE 2.5 MG/1
TABLET ORAL
Qty: 90 TABLET | Refills: 1 | Status: SHIPPED | OUTPATIENT
Start: 2021-06-02 | End: 2021-11-30

## 2021-06-02 RX ORDER — ATORVASTATIN CALCIUM 20 MG/1
20 TABLET, FILM COATED ORAL DAILY
Qty: 90 TABLET | Refills: 1 | Status: SHIPPED | OUTPATIENT
Start: 2021-06-02 | End: 2021-11-30

## 2021-06-02 NOTE — PROGRESS NOTES
Assessment and Plan:     1. Routine adult health maintenance    2. Hypertension      We reviewed her medications and she would like to continue to monitor BP. We reviewed potential complications associated with untreated HTN and she will follow up with ongoing elevated BPs.    - Comprehensive Metabolic Panel; Future  - HM2(CBC w/o Differential); Future    3. Mixed hyperlipidemia      We discussed her estimated CV risk and I encouraged her to consider resuming the lipitor. She is agreeable and that Rx was sent. We reviewed potential side effects at length, including muscle aches and abd pain, and she will call or return to clinic with any significant difficulties. We reviewed dietary recommendations, including low salt and high fiber diet, and  recommendations for regular exercise/activity. We discussed limiting saturated and trans fats in her diet and using more of the good fats such as olive oil, canola oil, flax seed and peanut oil, etc. She will follow up in the next few weeks for fasting labs.       - atorvastatin (LIPITOR) 20 MG tablet; Take 1 tablet (20 mg total) by mouth daily.  Dispense: 90 tablet; Refill: 3  - Lipid Cascade; Future  - Comprehensive Metabolic Panel; Future    4. Spinal stenosis of lumbosacral region      The patient's current medical problems were reviewed.    I have had an Advance Directives discussion with the patient.  The following health maintenance schedule was reviewed with the patient and provided in printed form in the after visit summary:   Health Maintenance   Topic Date Due     HEPATITIS C SCREENING  1945     COLONOSCOPY  01/26/1995     ZOSTER VACCINES (1 of 2) 01/26/1995     DXA SCAN  01/26/2010     MAMMOGRAM  03/02/2011     PNEUMOCOCCAL IMMUNIZATION 65+ LOW/MEDIUM RISK (2 of 2 - PPSV23) 10/20/2015     MEDICARE ANNUAL WELLNESS VISIT  05/24/2018     INFLUENZA VACCINE RULE BASED (1) 08/01/2019     FALL RISK ASSESSMENT  07/24/2020     ADVANCE CARE PLANNING  05/24/2022      TD 18+ HE  07/16/2023        Subjective:   Chief Complaint: Little Bryan is an 74 y.o. female here for an Annual Wellness visit.   HPI:   Fasting today? No  Hyperlipidemia & Hypertension      Patient is also here for follow-up of hypertension and dyslipidemia. A repeat fasting lipid profile was ordered and she would like to return to clinic for fasting labs. Compliance with treatment has been fair. She has previously been on lipitor but stopped it as she was worried about memory loss. She was also supposed to start amlodipine for BP , but didn't start it as she wasn't sure it was necessary. She has seen BPs of 135-145/60-80 recently at home. Patient denies muscle pain associated with her medications. Current symptoms: none. Patient denies chest pain, dyspnea, exertional chest pressure/discomfort, irregular heart beat, lower extremity edema and near-syncope. The patient exercises intermittently. Weight trend: decreasing steadily.      Previous history of cardiac disease includes: none.    Review of Systems:    working on diet and exercise  Had MRI last summer - L5-S1 stenosis - had PT with good results  Not interested in mammogram and colonoscopy    Please see above. The rest of the review of systems are negative for all systems.    Patient Care Team:  Heather Hou MD as PCP - General (Family Medicine)  Selam Carrillo MD as Assigned PCP     Patient Active Problem List   Diagnosis     Hypertension     Malignant Melanoma Of The Arm     Basal Cell Carcinoma Of The Skin     Hyperlipidemia     Hypokalemia     Polyps Of The Sigmoid Colon     No past medical history on file.   No past surgical history on file.   Family History   Problem Relation Age of Onset     Cancer Mother      Hypertension Mother      Hypertension Sister      Coronary artery disease Father      Hypertension Brother       Social History     Socioeconomic History     Marital status:      Spouse name: Not on file     Number of  "children: Not on file     Years of education: Not on file     Highest education level: Not on file   Occupational History     Not on file   Social Needs     Financial resource strain: Not on file     Food insecurity:     Worry: Not on file     Inability: Not on file     Transportation needs:     Medical: Not on file     Non-medical: Not on file   Tobacco Use     Smoking status: Never Smoker     Smokeless tobacco: Never Used   Substance and Sexual Activity     Alcohol use: Not on file     Drug use: Not on file     Sexual activity: Not on file   Lifestyle     Physical activity:     Days per week: Not on file     Minutes per session: Not on file     Stress: Not on file   Relationships     Social connections:     Talks on phone: Not on file     Gets together: Not on file     Attends Protestant service: Not on file     Active member of club or organization: Not on file     Attends meetings of clubs or organizations: Not on file     Relationship status: Not on file     Intimate partner violence:     Fear of current or ex partner: Not on file     Emotionally abused: Not on file     Physically abused: Not on file     Forced sexual activity: Not on file   Other Topics Concern     Not on file   Social History Narrative     Not on file      Current Outpatient Medications   Medication Sig Dispense Refill     coenzyme Q10 100 mg capsule Take 100 mg by mouth daily.       FOLIC ACID/MULTIVIT-MIN/LUTEIN (CENTRUM SILVER ORAL) Take by mouth.       indapamide (LOZOL) 2.5 MG tablet Take 1 tablet (2.5 mg total) by mouth daily. 90 tablet 3     lisinopril (PRINIVIL,ZESTRIL) 40 MG tablet Take 1 tablet (40 mg total) by mouth daily. 90 tablet 3     OMEGA-3S/DHA/EPA/FISH OIL (OMEGA 3 ORAL) Take by mouth.       No current facility-administered medications for this visit.       Objective:   Vital Signs:   Visit Vitals  BP (!) 156/94 (Patient Position: Sitting, Cuff Size: Adult Regular)   Pulse 82   Ht 5' 3.5\" (1.613 m)   Wt 167 lb 6 oz (75.9 kg) "   SpO2 99%   BMI 29.18 kg/m         VisionScreening:  No exam data present     PHYSICAL EXAM  General: alert, pleasant, and no distress  Head: Normocephalic, without obvious abnormality, atraumatic  Eyes: conjunctivae and sclerae normal and pupils equal, round, reactive to   light and accomodation  Ears: normal TM's and external ear canals both ears  Nose: no discharge, no sinus tenderness  Throat: lips, mucosa, and tongue normal; teeth and gums normal  Neck: no adenopathy, supple, symmetrical, trachea midline and thyroid normal  Back: symmetric, ROM normal. No CVA tenderness.  Lungs: clear to auscultation bilaterally  Breasts: exam deferred  Heart : regular rate and rhythm and no murmurs  Abdomen:  bowel sounds normal, no masses palpable and soft, non-tender  Pelvic: exam deferred   Extremities: extremities normal, atraumatic, no cyanosis or edema  Pulses: 2+ and symmetric  Skin: Skin color, texture, turgor normal. No rashes or lesions  Lymph nodes: Cervical, supraclavicular, and axillary nodes normal.  Neurologic: Grossly normal             Assessment Results 10/15/2019   Activities of Daily Living No help needed   Instrumental Activities of Daily Living No help needed   Get Up and Go Score Less than 12 seconds   Mini Cog Total Score 5   Some recent data might be hidden     A Mini-Cog score of 0-2 suggests the possibility of dementia, score of 3-5 suggests no dementia    Identified Health Risks:     The patient was counseled and encouraged to consider modifying their diet and eating habits. She was provided with information on recommended healthy diet options.  Information regarding advance directives (living [place]), including where she can download the appropriate form, was provided to the patient via the AVS.

## 2021-06-03 ENCOUNTER — COMMUNICATION - HEALTHEAST (OUTPATIENT)
Dept: FAMILY MEDICINE | Facility: CLINIC | Age: 76
End: 2021-06-03

## 2021-06-03 VITALS
BODY MASS INDEX: 28.57 KG/M2 | OXYGEN SATURATION: 99 % | SYSTOLIC BLOOD PRESSURE: 156 MMHG | HEIGHT: 64 IN | WEIGHT: 167.38 LBS | HEART RATE: 82 BPM | DIASTOLIC BLOOD PRESSURE: 94 MMHG

## 2021-06-03 VITALS — BODY MASS INDEX: 30.34 KG/M2 | WEIGHT: 174 LBS

## 2021-06-03 VITALS — WEIGHT: 171.4 LBS | BODY MASS INDEX: 29.89 KG/M2

## 2021-06-03 DIAGNOSIS — I10 HTN (HYPERTENSION): ICD-10-CM

## 2021-06-03 RX ORDER — LISINOPRIL 40 MG/1
40 TABLET ORAL DAILY
Qty: 90 TABLET | Refills: 1 | Status: SHIPPED | OUTPATIENT
Start: 2021-06-03 | End: 2021-12-02

## 2021-06-08 NOTE — TELEPHONE ENCOUNTER
Due for labs    Rx renewed per Medication Renewal Policy. Medication was last renewed on 5/24/19.    Adrienne Ansari, [facility] [place] Connection Triage/Med Refill 6/12/2020     Requested Prescriptions   Pending Prescriptions Disp Refills     lisinopriL (PRINIVIL,ZESTRIL) 40 MG tablet [Pharmacy Med Name: LISINOPRIL 40MG TABLETS] 90 tablet 3     Sig: TAKE 1 TABLET(40 MG) BY MOUTH DAILY       Ace Inhibitors Refill Protocol Passed - 6/10/2020  9:39 AM        Passed - PCP or prescribing provider visit in past 12 months       Last office visit with prescriber/PCP: Visit date not found OR same dept: 7/24/2019 Farhad Sandhu CNP OR same specialty: 7/24/2019 Farhad Sandhu CNP  Last physical: 10/15/2019 Last MTM visit: Visit date not found   Next visit within 3 mo: Visit date not found  Next physical within 3 mo: Visit date not found  Prescriber OR PCP: Heather Hou MD  Last diagnosis associated with med order: 1. HTN (hypertension)  - lisinopriL (PRINIVIL,ZESTRIL) 40 MG tablet [Pharmacy Med Name: LISINOPRIL 40MG TABLETS]; TAKE 1 TABLET(40 MG) BY MOUTH DAILY  Dispense: 90 tablet; Refill: 3  - indapamide (LOZOL) 2.5 MG tablet [Pharmacy Med Name: INDAPAMIDE 2.5MG TABLETS]; TAKE 1 TABLET(2.5 MG) BY MOUTH DAILY  Dispense: 90 tablet; Refill: 3    If protocol passes may refill for 12 months if within 3 months of last provider visit (or a total of 15 months).             Passed - Serum Potassium in past 12 months     No results found for: LN-POTASSIUM          Passed - Blood pressure filed in past 12 months     BP Readings from Last 1 Encounters:   10/15/19 (!) 156/94             Passed - Serum Creatinine in past 12 months     Creatinine   Date Value Ref Range Status   06/11/2019 0.83 0.60 - 1.10 mg/dL Final                indapamide (LOZOL) 2.5 MG tablet [Pharmacy Med Name: INDAPAMIDE 2.5MG TABLETS] 90 tablet 3     Sig: TAKE 1 TABLET(2.5 MG) BY MOUTH DAILY       Diuretics/Combination Diuretics Refill Protocol  Passed - 6/10/2020   9:39 AM        Passed - Visit with PCP or prescribing provider visit in past 12 months     Last office visit with prescriber/PCP: Visit date not found OR same dept: 7/24/2019 Farhad Sandhu CNP OR same specialty: 7/24/2019 Farhad Sandhu CNP  Last physical: 10/15/2019 Last MTM visit: Visit date not found   Next visit within 3 mo: Visit date not found  Next physical within 3 mo: Visit date not found  Prescriber OR PCP: Heather Hou MD  Last diagnosis associated with med order: 1. HTN (hypertension)  - lisinopriL (PRINIVIL,ZESTRIL) 40 MG tablet [Pharmacy Med Name: LISINOPRIL 40MG TABLETS]; TAKE 1 TABLET(40 MG) BY MOUTH DAILY  Dispense: 90 tablet; Refill: 3  - indapamide (LOZOL) 2.5 MG tablet [Pharmacy Med Name: INDAPAMIDE 2.5MG TABLETS]; TAKE 1 TABLET(2.5 MG) BY MOUTH DAILY  Dispense: 90 tablet; Refill: 3    If protocol passes may refill for 12 months if within 3 months of last provider visit (or a total of 15 months).             Passed - Serum Potassium in past 12 months      No results found for: LN-POTASSIUM          Passed - Serum Sodium in past 12 months      No results found for: LN-SODIUM          Passed - Blood pressure on file in past 12 months     BP Readings from Last 1 Encounters:   10/15/19 (!) 156/94             Passed - Serum Creatinine in past 12 months      Creatinine   Date Value Ref Range Status   06/11/2019 0.83 0.60 - 1.10 mg/dL Final

## 2021-06-09 NOTE — PROGRESS NOTES
"Walk IN Clinic Note    CC: cough and sore throat    HPI:   Little Bryan is a 72 y.o. old female with past medical history significant for hypertension.  Patient presented to the walk-in clinic with concern of 4 days cough and sore throat.  Patient described the cough as \"croupy\".  Also a report of chest congestion, headaches, mild shortness of breath with exertion.  Patient denied any chest pain, nausea vomiting, changes in her urinary or bowel habits.  10 days ago her gr daughter was diagnosed with strep and was on treatment.  Patient also reports that a month ago she have a cold with lasted about 10 days.      Review of Systems   10-point review of systems negative except as noted above.    Past Medical History  Patient Active Problem List    Diagnosis Date Noted     Hypertension      Malignant Melanoma Of The Arm      Basal Cell Carcinoma Of The Skin      Hyperlipidemia      Hypokalemia      Polyps Of The Sigmoid Colon        No past medical history on file.    Medications   Current Outpatient Prescriptions on File Prior to Visit   Medication Sig Dispense Refill     coenzyme Q10 100 mg capsule Take 100 mg by mouth daily.       FOLIC ACID/MULTIVIT-MIN/LUTEIN (CENTRUM SILVER ORAL) Take by mouth.       indapamide (LOZOL) 2.5 MG tablet Take 1 tablet (2.5 mg total) by mouth daily. 90 tablet 3     lisinopril (PRINIVIL,ZESTRIL) 40 MG tablet TAKE 1 TABLET BY MOUTH EVERY DAY 90 tablet 0     OMEGA-3S/DHA/EPA/FISH OIL (OMEGA 3 ORAL) Take by mouth.       No current facility-administered medications on file prior to visit.          Physical Exam:  Visit Vitals     /90     Pulse 100     Temp 99.8  F (37.7  C) (Oral)     Resp 16     Wt 185 lb 1.6 oz (84 kg)     SpO2 96%     BMI 33.05 kg/m2     General appearance: alert, appears stated age and cooperative  Head: Normocephalic, without obvious abnormality, atraumatic  Eyes: negative findings: conjunctivae and sclerae normal  Ears: normal TM's and external ear canals " both ears  Nose: Nares normal. Septum midline. Mucosa normal. No drainage or sinus tenderness.  Throat: abnormal findings: mild oropharyngeal erythema  Neck: no adenopathy and thyroid not enlarged, symmetric, no tenderness/mass/nodules  Lungs: clear to auscultation bilaterally  Heart: regular rate and rhythm, S1, S2 normal, no murmur, click, rub or gallop    Assessment/Plan:   72 years old female with sore throat and cough seem to be consistent with viral URI.  Encourage patient to continue with symptomatic treatment of ibuprofen, Tylenol as needed, over-the-counter Mucinex.  Also instructed patient to stay well-hydrated.  Follow up if symptoms worsen or persist.    I also discussed with patient about her elevated blood pressure.  Will not change her treatment in the setting of her acute illness.  Patient will continue with lisinopril and monitor her blood pressure.  Follow-up with primary physician if needed.

## 2021-06-09 NOTE — PROGRESS NOTES
"Little Bryan is a 75 y.o. female who is being evaluated via a billable telephone visit.      The patient has been notified of following:     \"This telephone visit will be conducted via a call between you and your physician/provider. We have found that certain health care needs can be provided without the need for a physical exam.  This service lets us provide the care you need with a short phone conversation.  If a prescription is necessary we can send it directly to your pharmacy.  If lab work is needed we can place an order for that and you can then stop by our lab to have the test done at a later time.    Telephone visits are billed at different rates depending on your insurance coverage. During this emergency period, for some insurers they may be billed the same as an in-person visit.  Please reach out to your insurance provider with any questions.    If during the course of the call the physician/provider feels a telephone visit is not appropriate, you will not be charged for this service.\"    Patient has given verbal consent to a Telephone visit? Yes    What phone number would you like to be contacted at? [phone]    Patient would like to receive their AVS by AVS Preference: Mail a copy.    Additional provider notes:   Last fasting labs:   Hyperlipidemia & Hypertension      Little Bryan is a 75 y.o. female who was contacted for follow-up of hypertension and dyslipidemia. Compliance with treatment has been good. Patient denies muscle pain associated with her medications. Current symptoms: none. Patient denies chest pain, dyspnea, exertional chest pressure/discomfort, lower extremity edema and near-syncope. The patient exercises intermittently. Weight trend: stable.      Previous history of cardiac disease includes: none.      She has been staying home due to COVID, and has been very careful about minimizing exposures, etc. Her daughter is bringing her supplies and she is doing online grocery " shopping, etc. She is not interested in coming in for labs anytime soon, gut did have them checked last in October. She did resume lipitor at that time.          Assessment/Plan:  1. HTN (hypertension)  indapamide (LOZOL) 2.5 MG tablet    lisinopriL (PRINIVIL,ZESTRIL) 40 MG tablet   2. Mixed hyperlipidemia  atorvastatin (LIPITOR) 20 MG tablet        Fasting labs were discussed but she declines to come in at this time for fear of infectino. Blood pressure has been under adequate control at home. We reviewed her current medications and she will continue the same pending additional lab results. We reviewed dietary recommendations, including low salt and high fiber diet, and recommendations for regular exercise/activity. She will plan to follow up in 4-6 mos for repeat fasting labs and med check, sooner if any difficulties.    Phone call duration:  15 minutes    Heather Hou MD

## 2021-06-10 NOTE — PROGRESS NOTES
ASSESSMENT:  1. Screening for colon cancer  She's considering stool cards for screening.  She does not want to do a colonoscopy.  - Occult Blood(ICT); Future    2. Hyperlipidemia  She's a minimalist when it comes to getting care.  However, she has a FHx of CAD in her dad and two of her sisters.  We'll get labs again, but I'm certain we'll be making a recommendation to treat w/ statins.  - Comprehensive Metabolic Panel  - Lipid Cascade  - Thyroid Cascade    3. Essential hypertension  Well controlled now, better than last year.  - Comprehensive Metabolic Panel    4. Medicare annual wellness visit, subsequent  We discussed immunizations which are current.  She hasn't done the shingles vaccine though.  Her cancer screens are not up to date and we discussed that.  We'll have her work on exercise.  We discussed healthy diet choices.  She's been given a handout on advanced care directives as she does not have one.    5. Screening mammogram, encounter for  Ordered  - Mammo Screening Bilateral; Future        PLAN:  Patient Instructions   [phone]- [facility] DEXA--that's the number for the bone density.  If you'd like to do that, please let me know and I'll order it.  Please set up your mammogram:  [phone]  Please complete and return the stool cards for colon cancer screening.  Monitor your shortness of breath.  If you can't improve that by doing more aerobic activity, then please see me back to discuss.  Take care!      Orders Placed This Encounter   Procedures     Mammo Screening Bilateral     Standing Status:   Future     Standing Expiration Date:   5/25/2018     Scheduling Instructions:      PATIENT WILL SCHEDULE?  No     Order Specific Question:   Reason for Exam (Describe Symptoms):     Answer:   Screening mammogram     Order Specific Question:   Can the procedure be changed per Radiologist protocol?     Answer:   Yes     Occult Blood(ICT)     Standing Status:   Future     Standing Expiration Date:   8/22/2017      Comprehensive Metabolic Panel     Lipid Cascade     Order Specific Question:   Fasting is required?     Answer:   No     Thyroid Cascade     There are no discontinued medications.    Return in about 1 year (around 5/24/2018).    The patient was given an After Visit Summary.     CHIEF COMPLAINT:  Chief Complaint   Patient presents with     Medicare Annual Wellness Visit Subsequent       HISTORY OF PRESENT ILLNESS:  Little is a 72 y.o. female here today for an Annual Wellness Exam. She is not fasting.     Hypertension: She measures her blood pressure regularly at home. Using a larger cuff has worked at giving her more accurate readings. Her blood pressure today was 136/74.     Health Maintenance: She is overdue for a colonoscopy. She is willing to complete stool cards. She is overdue for a DXA bone density scan, but even if she were to have osteoporosis she would not be interested in treatment. She is overdue for a mammogram and is hesitant in having another one. Her last mammogram was in 2009.     Shortness of Breath: She has shortness of breath whenever walking. This occurs regularly when she is walking in a parking lot to the store and is trying to simultaneously talk. Her daughter noticed her shortness of breath, she does not usually notice it.. She denies chest pain. She has a history of URI infections and wonders if that could be a factor to her shortness of breath. She is also exposed to second hand smoke from the people that smoke in her building. There is strong family history of CHF and heart issues.     REVIEW OF SYSTEMS:   She would like to keep her medication list short because each medication may come with a side effect. She tries to avoid radiation as much as she can. She had dermatitis on her hands, but it has improved since she stopped compulsively washing her hands. All other systems are negative.    PFSH:  Arthritis runs in her family. She does not drink frequently. Sisters have Sjogren's and  "CHF. Her mother also has CHF. Another sister has heart disease. Her mother had hyperthyroidism.     No past medical history on file.  No past surgical history on file.  Family History   Problem Relation Age of Onset     Cancer Mother      Hypertension Sister      Hypertension Mother      Coronary artery disease Father      Hypertension Brother      Social History     Social History     Marital status:      Spouse name: N/A     Number of children: N/A     Years of education: N/A     Occupational History     Not on file.     Social History Main Topics     Smoking status: Never Smoker     Smokeless tobacco: Never Used     Alcohol use Not on file     Drug use: Not on file     Sexual activity: Not on file     Other Topics Concern     Not on file     Social History Narrative       PHYSICAL EXAM:  Vitals:    05/24/17 1254   BP: 136/74   Patient Site: Right Arm   Pulse: 74   Weight: 178 lb 3 oz (80.8 kg)   Height: 5' 3.5\" (1.613 m)     Body mass index is 31.07 kg/(m^2).    PHQ-9 score: 2  BIMs score:  15  Get up and Go score: pass, 8  Fall risk assessment: 0  Activities of Daily Living assessment: 6  Advance Directives: Discussed    Current providers:   PCP-Dr. Selam Carrillo     ADDITIONAL HISTORY SUMMARIZED (2): Reviewed note from 1/28/16 regarding hypertension.   DECISION TO OBTAIN EXTRA INFORMATION (1): None.   RADIOLOGY TESTS (1): Reviewed mammogram from 3/2/2009  LABS (1): Reviewed labs from 1/28/16. Ordered labs.   MEDICINE TESTS (1): None.  INDEPENDENT REVIEW (2 each): None.    The visit lasted a total of 22 minutes face to face with the patient. Over 50% of the time was spent counseling and educating the patient about shortness of breath.    I, Radha Watters, am scribing for and in the presence of, Selam Carrillo MD.    I, Selam Carrillo MD, personally performed the services described in this documentation, as scribed by Radha Watters in my presence, and it is both accurate and " complete.    MEDICATIONS:  Current Outpatient Prescriptions   Medication Sig Dispense Refill     coenzyme Q10 100 mg capsule Take 100 mg by mouth daily.       FOLIC ACID/MULTIVIT-MIN/LUTEIN (CENTRUM SILVER ORAL) Take by mouth.       indapamide (LOZOL) 2.5 MG tablet TAKE ONE TABLET(2.5MG TOTAL) BY MOUTH DAILY 90 tablet 0     lisinopril (PRINIVIL,ZESTRIL) 40 MG tablet TAKE 1 TABLET BY MOUTH EVERY DAY 90 tablet 0     OMEGA-3S/DHA/EPA/FISH OIL (OMEGA 3 ORAL) Take by mouth.       No current facility-administered medications for this visit.        Total data points: 4

## 2021-06-12 NOTE — PROGRESS NOTES
"ASSESSMENT and PLAN:  1. Cough  She had a prolonged cough twice this year.  She's feels short of breath in humdity.  We'll get PFTs.  - PFT Lung Vloumne With Spirometry and Broncho Dilator; Future    2. Tobacco smoke exposure  She's concerned that her lungs are damaged from smoke exposure and/or her prior lung infections.  We discussed getting lung function tests to evaluate her lungs. She was hoping for a cxr, but I advised that it wouldn't give us as much info about her lungs.  - PFT Lung Vloumne With Spirometry and Broncho Dilator; Future    3. Plantar fasciitis  We discussed continue to avoid going barefoot as well as ice and streching.  If not improved, then we can do formal PT.      There are no discontinued medications.    No Follow-up on file.    Patient Instructions   Please call to set up your lung function test at [phone].             CHIEF COMPLAINT:  Chief Complaint   Patient presents with     Sore Throat     Pt states she had a sore throat and cough in the beginning of July. Concerned about lung function     Heel Pain     Left foot, ongoing since        HISTORY OF PRESENT ILLNESS:  Little Bryan is a 72 y.o. female  presenting to the clinic today b/c she's concerned about having an infection in her lungs.  She developed a sore throat and cough in the beginning of July.  It was from July 3-.  She had high fever up to 102.5.  Her sx have resolved, but she's concerned about her lungs since she's had a  respiratory infection this year end -Feb.  She wasn't sick prior to FDC.  She's concerned about second hand smoke exposure.  She'd really like a cxr to \"make sure she doesn't have lung cancer.\"  We discussed current screening guidelines and she is not in a qualifying group for low dose CT chest.    In the past few years, she's noted trouble breathing in humid air.  She feels like she can't breath.  She avoids outside activity when it's humid.  It's not " associated w/ a cough.    She also has left heel pain.  That's been present since mid June.  It was very painful to get out bed.  She's started wearing supportive sandals in her home instead of going barefoot and that's helped.    REVIEW OF SYSTEMS:    All other systems are negative.    PFSH:  She has two sisters who are on a lot more meds than she is; they don't get respiratory infections  She lives in a complex w/ >70 units.  The hallways aren't air conditioned.      TOBACCO USE:  History   Smoking Status     Never Smoker   Smokeless Tobacco     Never Used       VITALS:  Vitals:    07/27/17 1309   BP: 144/90   Patient Site: Right Arm   Pulse: 88   SpO2: 98%   Weight: 176 lb 6 oz (80 kg)     Wt Readings from Last 3 Encounters:   07/27/17 176 lb 6 oz (80 kg)   05/24/17 178 lb 3 oz (80.8 kg)   02/21/17 185 lb 1.6 oz (84 kg)         PHYSICAL EXAM:  Constitutional:  Reveals an alert,female.   Vitals:  Noted.   Head: Normocephalic, without obvious abnormality, atraumatic   Lungs: Clear to auscultation bilaterally, respirations unlabored.   Heart: Regular rate and rhythm, S1 and S2 normal, no murmur, rub, or gallop,   Extremities: Extremities normal, atraumatic, no cyanosis or edema   Skin: Skin color, texture, turgor normal, no rashes or lesions on exposed skin    QUALITY MEASURES:  The following high BMI interventions were performed this visit: weight monitoring    MEDICATIONS:  Current Outpatient Prescriptions   Medication Sig Dispense Refill     coenzyme Q10 100 mg capsule Take 100 mg by mouth daily.       FOLIC ACID/MULTIVIT-MIN/LUTEIN (CENTRUM SILVER ORAL) Take by mouth.       indapamide (LOZOL) 2.5 MG tablet TAKE ONE TABLET(2.5MG TOTAL) BY MOUTH DAILY 90 tablet 3     lisinopril (PRINIVIL,ZESTRIL) 40 MG tablet Take 1 tablet (40 mg total) by mouth daily. 90 tablet 3     OMEGA-3S/DHA/EPA/FISH OIL (OMEGA 3 ORAL) Take by mouth.       No current facility-administered medications for this visit.

## 2021-06-13 NOTE — PROGRESS NOTES
Chief Complaint   Patient presents with     Flu Vaccine     Flu consent and contraindication forms are given/ signed/ reviewed and sent to medical records to scan.     Anne Shaw CMA [facility] 10/24/2017 1:06 PM

## 2021-06-15 NOTE — TELEPHONE ENCOUNTER
Fax received from [facility] requesting three medications to be refilled.     -Atorvastatin 20 mg tablet  -Indapamide 2.5 mg tablet  -Lisinopril 40 mg tablet     All prescriptions were last prescribed on 6/18/20 after a virtual visit with PCP.    Patient was contacted to schedule a fasting med check. She is still hesitant to come into the clinic at the moment. Would like to wait until spring to be seen. Apt scheduled for 5/3/2021. Offered a virtual visit but patient declined. Would rather be seen in person.     Dr. Hou are you okay sending the patient a two month supply to get her through until her scheduled apt?

## 2021-06-16 PROBLEM — M48.07 SPINAL STENOSIS OF LUMBOSACRAL REGION: Status: ACTIVE | Noted: 2018-06-06

## 2021-06-17 NOTE — PATIENT INSTRUCTIONS - HE
Patient Instructions by Heather Hou MD at 10/15/2019  1:20 PM     Author: Heather Hou MD Service: -- Author Type: Physician    Filed: 10/15/2019  1:46 PM Encounter Date: 10/15/2019 Status: Signed    : Heather Hou MD (Physician)         Patient Education   Understanding USDA MyPlate  The USDA (US Department of Agriculture) has guidelines to help you make healthy food choices. These are called MyPlate. MyPlate shows the food groups that make up healthy meals using the image of a place setting. Before you eat, think about the healthiest choices for what to put onto your plate or into your cup or bowl. To learn more about building a healthy plate, visit www.choosemyplate.gov.       The Food Groups    Fruits: Any fruit or 100% fruit juice counts as part of the Fruit Group. Fruits may be fresh, canned, frozen, or dried, and may be whole, cut-up, or pureed. Make half your plate fruits and vegetables.    Vegetables: Any vegetable or 100% vegetable juice counts as a member of the Vegetable Group. Vegetables may be fresh, frozen, canned, or dried. They can be served raw or cooked and may be whole, cut-up, or mashed. Make half your plate fruits and vegetables.     Grains: All foods made from grains are part of the Grains Group. These include wheat, rice, oats, cornmeal, and barley such as bread, pasta, oatmeal, cereal, tortillas, and grits. Grains should be no more than a quarter of your plate. At least half of your grains should be whole grains.    Protein: This group includes meat, poultry, seafood, beans and peas, eggs, processed soy products (like tofu), nuts (including nut butters), and seeds. Make protein choices no more than a quarter of your plate. Meat and poultry choices should be lean or low fat.    Dairy: All fluid milk products and foods made from milk that contain calcium, like yogurt and cheese are part of the Dairy Group. (Foods that have little calcium, such as cream,  butter, and cream cheese, are not part of the group.) Most dairy choices should be low-fat or fat-free.    Oils: These are fats that are liquid at room temperature. They include canola, corn, olive, soybean, and sunflower oil. Foods that are mainly oil include mayonnaise, certain salad dressings, and soft margarines. You should have only 5 to 7 teaspoons of oils a day. You probably already get this much from the food you eat.  Use [employer] medicaler to Help Build Your Meals  The SuperTracker can help you plan and track your meals and activity. You can look up individual foods to see or compare their nutritional value. You can get guidelines for what and how much you should eat. You can compare your food choices. And you can assess personal physical activities and see ways you can improve. Go to www.Malwarebytes.gov/Wise Connectcker/.    4136-9759 The [employer]. [address] [place] [address]. All rights reserved. This information is not intended as a substitute for professional medical care. Always follow your healthcare professional's instructions.           Patient Education   Understanding Advance Care Planning  Advance care planning is the process of deciding ones own future medical care. It helps ensure that if you cant speak for yourself, your wishes can still be carried out. The plan is a series of legal documents that note a persons wishes. The documents vary by state. Advance care planning may be done when a person has a serious illness that is expected to get worse. It may be done before major surgery. And it can help you and your family be prepared in case of a major illness or injury. Advance care planning helps with making decisions at these times.       A health care proxy is a person who acts as the voice of a patient when the patient cant speak for himself or herself. The name of this role varies by state. It may be called a Durable Medical Power of  or Durable Power of   for Healthcare. It may be called an agent, surrogate, or advocate. Or it may be called a representative or decision maker. It is an official duty that is identified by a legal document. The document also varies by state.    Why Is Advance Care Planning Important?  If a person communicates their healthcare wishes:    They will be given medical care that matches their values and goals.    Their family members will not be forced to make decisions in a crisis with no guidance.  Creating a Plan  Making an advance care plan is often done in 3 steps:    Thinking about ones wishes. To create an advance care plan, you should think about what kind of medical treatment you would want if you lose the ability to communicate. Are there any situations in which you would refuse or stop treatment? Are there therapies you would want or not want? And whom do you want to make decisions for you? There are many places to learn more about how to plan for your care. Ask your doctor or  for resources.    Picking a health care proxy. This means choosing a trusted person to speak for you only when you cant speak for yourself. When you cannot make medical decisions, your proxy makes sure the instructions in your advance care plan are followed. A proxy does not make decisions based on his or her own opinions. They must put aside those opinions and values if needed, and carry out your wishes.    Filling out the legal documents. There are several kinds of legal documents for advance care planning. Each one tells health care providers your wishes. The documents may vary by state. They must be signed and may need to be witnessed or notarized. You can cancel or change them whenever you wish. Depending on your state, the documents may include a Healthcare Proxy form, Living Will, Durable Medical Power of , Advance Directive, or others.  The Familys Role  The best help a family can give is to support their loved ones wishes. Open  and honest communication is vital. Family should express any concerns they have about the patients choices while the patient can still make decisions.    5580-8989 The [employer]. [address] [place] [address], [place] [address]. All rights reserved. This information is not intended as a substitute for professional medical care. Always follow your healthcare professional's instructions.         Also, [facility] offers a free, downloadable health care directive that allows you to share your treatment choices and personal preferences if you cannot communicate your wishes. It also allows you to appoint another person (called a health care agent) to make health care decisions if you are unable to do so. You can download an advance directive by going here: [email or web address]     Patient Education   Personalized Prevention Plan  You are due for the preventive services outlined below.  Your care team is available to assist you in scheduling these services.  If you have already completed any of these items, please share that information with your care team to update in your medical record.  Health Maintenance   Topic Date Due   ? HEPATITIS C SCREENING  1945   ? COLONOSCOPY  01/26/1995   ? ZOSTER VACCINES (1 of 2) 01/26/1995   ? DXA SCAN  01/26/2010   ? MAMMOGRAM  03/02/2011   ? PNEUMOCOCCAL IMMUNIZATION 65+ LOW/MEDIUM RISK (2 of 2 - PPSV23) 10/20/2015   ? MEDICARE ANNUAL WELLNESS VISIT  05/24/2018   ? INFLUENZA VACCINE RULE BASED (1) 08/01/2019   ? FALL RISK ASSESSMENT  07/24/2020   ? ADVANCE CARE PLANNING  05/24/2022   ? TD 18+ HE  07/16/2023

## 2021-06-17 NOTE — PROGRESS NOTES
Assessment:         1. Hypertension  amLODIPine (NORVASC) 5 MG tablet    Comprehensive Metabolic Panel    HM2(CBC w/o Differential)   2. Mixed hyperlipidemia  Lipid Cascade FASTING    HM2(CBC w/o Differential)   3. Screening for cardiovascular condition              Plan:          Fasting labs were drawn. Blood pressure is under fair control. We reviewed her current medications and she will increase the amlodipine to 5 mg daily, and will otherwise continue the same pending additional lab results. We reviewed dietary recommendations, including low salt and high fiber diet, and recommendations for regular exercise/activity. She will plan to follow up in 4-6 mos for repeat fasting labs and med check, sooner if any difficulties.         Subjective:        Fasting today? Yes  Hypertension & Hyperlipidemia      Little Bryan is a 76 y.o. female here for follow-up of elevated blood pressure. A repeat fasting lipid profile was done. Compliance with treatment has been good. Patient denies muscle pain associated with her medications. Associated signs and symptoms: none. Denies chest pain, dyspnea, palpitations, peripheral edema and tiredness/fatigue. The patient exercises several times per week. Weight trend: fluctuating a bit.        Previous history of cardiac disease includes: none.        She reports some difficulty sleeping recently with frequent awakenings. She also reports that her anxiety has increased a bit.        The following portions of the patient's history were reviewed and updated as appropriate: allergies, current medications, past family history, past medical history, past social history, past surgical history and problem list.    Review of Systems  A 12 point comprehensive review of systems was negative except as noted.          Objective:        Vitals:    05/03/21 1152   BP: (!) 150/92   Patient Position: Sitting   Cuff Size: Adult Regular   Pulse: 82   SpO2: 97%   Weight: 173 lb 3 oz (78.6 kg)  "  Height: 5' 3.5\" (1.613 m)          General:    Alert, cooperative, no distress   Head:    Normocephalic, without obvious abnormality, atraumatic   Eyes:    PERRL, conjunctiva/corneas clear, EOM's intact    Ears:    Normal TM's and external ear canals, both ears   Nose:   Nares normal, mucosa normal, no drainage or sinus tenderness   Throat:   Lips, mucosa, and tongue normal; teeth and gums normal   Neck:   Supple, symmetrical,  no adenopathy;  thyroid:  normal   Back:     Symmetric, ROM normal, no CVA tenderness   Lungs:     Clear to auscultation bilaterally, respirations unlabored   CV:    Regular rate and rhythm   Abdomen:     Soft, non-tender, no masses, no organomegaly   Extremities:   Extremities normal, atraumatic, no cyanosis or edema   Pulses:   2+ and symmetric all extremities   Skin:   Skin color, texture, turgor normal, no rashes or lesions   Neurologic:   normal strength and tone throughout     Results for orders placed or performed in visit on 05/03/21   Lipid [place] FASTING   Result Value Ref Range    Cholesterol 172 <=199 mg/dL    Triglycerides 90 <=149 mg/dL    HDL Cholesterol 61 >=50 mg/dL    LDL Calculated 93 <=129 mg/dL    Patient Fasting > 8hrs? Yes    Comprehensive Metabolic Panel   Result Value Ref Range    Sodium 140 136 - 145 mmol/L    Potassium 3.5 3.5 - 5.0 mmol/L    Chloride 101 98 - 107 mmol/L    CO2 29 22 - 31 mmol/L    Anion Gap, Calculation 10 5 - 18 mmol/L    Glucose 84 70 - 125 mg/dL    BUN 24 8 - 28 mg/dL    Creatinine 0.77 0.60 - 1.10 mg/dL    GFR MDRD Af Amer >60 >60 mL/min/1.73m2    GFR MDRD Non Af Amer >60 >60 mL/min/1.73m2    Bilirubin, Total 1.3 (H) 0.0 - 1.0 mg/dL    Calcium 9.6 8.5 - 10.5 mg/dL    Protein, Total 7.2 6.0 - 8.0 g/dL    Albumin 3.5 3.5 - 5.0 g/dL    Alkaline Phosphatase 84 45 - 120 U/L    AST 17 0 - 40 U/L    ALT 24 0 - 45 U/L   HM2(CBC w/o Differential)   Result Value Ref Range    WBC 6.4 4.0 - 11.0 thou/uL    RBC 4.48 3.80 - 5.40 mill/uL    Hemoglobin 13.3 " 12.0 - 16.0 g/dL    Hematocrit 40.3 35.0 - 47.0 %    MCV 90 80 - 100 fL    MCH 29.7 27.0 - 34.0 pg    MCHC 33.0 32.0 - 36.0 g/dL    RDW 13.0 11.0 - 14.5 %    Platelets 282 140 - 440 thou/uL    MPV 8.9 7.0 - 10.0 fL

## 2021-06-19 NOTE — LETTER
Letter by Jitendra Ovalles MD at      Author: Jitendra Ovalles MD Service: -- Author Type: --    Filed:  Encounter Date: 8/21/2019 Status: (Other)         Little Bryan  [address]  [address] [place] [id number]             August 21, 2019         Dear Ms. Bryan,    Our records show that you are due for a colonoscopy.  We do want to inform you that there are a couple of other options besides the traditional colonoscopy that we offer.  If you would like to do the traditional colonoscopy, please contact a [facility] near you according to the card enclosed.  If you would like to do one of the other options available to you, please let you primary doctor know and they will get that ordered.  Enclosed is some information on the other options for you to read over.  If you have had any of these done at another facility, please arrange for us to receive those records so we can update your chart.    Please call with questions or contact us using [employer].    Sincerely,        Electronically signed by Jitendra Ovalles MD

## 2021-06-19 NOTE — LETTER
Letter by Farhad Sandhu CNP at      Author: Farhad Sandhu CNP Service: -- Author Type: --    Filed:  Encounter Date: 6/12/2019 Status: (Other)         Little Bryan  [address] [facility]  [address] [place] [id number]             June 12, 2019         Dear Ms. Bryan,    Below are the results from your recent visit:    Resulted Orders   Lipid Profile   Result Value Ref Range    Triglycerides 125 <=149 mg/dL    Cholesterol 257 (H) <=199 mg/dL    LDL Calculated 176 (H) <=129 mg/dL    HDL Cholesterol 56 >=50 mg/dL    Patient Fasting > 8hrs? Yes    Comprehensive Metabolic Panel   Result Value Ref Range    Sodium 142 136 - 145 mmol/L    Potassium 3.5 3.5 - 5.0 mmol/L    Chloride 103 98 - 107 mmol/L    CO2 29 22 - 31 mmol/L    Anion Gap, Calculation 10 5 - 18 mmol/L    Glucose 84 70 - 125 mg/dL    BUN 27 8 - 28 mg/dL    Creatinine 0.83 0.60 - 1.10 mg/dL    GFR MDRD Af Amer >60 >60 mL/min/1.73m2    GFR MDRD Non Af Amer >60 >60 mL/min/1.73m2    Bilirubin, Total 1.0 0.0 - 1.0 mg/dL    Calcium 10.0 8.5 - 10.5 mg/dL    Protein, Total 7.2 6.0 - 8.0 g/dL    Albumin 3.3 (L) 3.5 - 5.0 g/dL    Alkaline Phosphatase 73 45 - 120 U/L    AST 12 0 - 40 U/L    ALT <9 0 - 45 U/L    Narrative    Fasting Glucose reference range is 70-99 mg/dL per  American Diabetes Association (ADA) guidelines.   HM2(CBC w/o Differential)   Result Value Ref Range    WBC 5.1 4.0 - 11.0 thou/uL    RBC 4.62 3.80 - 5.40 mill/uL    Hemoglobin 13.7 12.0 - 16.0 g/dL    Hematocrit 40.4 35.0 - 47.0 %    MCV 87 80 - 100 fL    MCH 29.7 27.0 - 34.0 pg    MCHC 33.9 32.0 - 36.0 g/dL    RDW 12.5 11.0 - 14.5 %    Platelets 283 140 - 440 thou/uL    MPV 6.1 (L) 7.0 - 10.0 fL       Your cholesterol levels are still too high.  I would recommend that you go back on your atorvastatin.  The rest of your labs look normal.    Please call with questions or contact us using [facility].    Sincerely,        Electronically signed by Farhad Sandhu CNP

## 2021-06-21 NOTE — LETTER
Letter by Heather Hou MD at      Author: Heather Hou MD Service: -- Author Type: --    Filed:  Encounter Date: 5/7/2021 Status: (Other)         Little Bryan  [address] [facility]  [address]  [facility] [id number]             May 7, 2021         Dear Ms. Bryan,    Below are the results from your recent visit:    Resulted Orders   Lipid [place] FASTING   Result Value Ref Range    Cholesterol 172 <=199 mg/dL    Triglycerides 90 <=149 mg/dL    HDL Cholesterol 61 >=50 mg/dL    LDL Calculated 93 <=129 mg/dL    Patient Fasting > 8hrs? Yes    Comprehensive Metabolic Panel   Result Value Ref Range    Sodium 140 136 - 145 mmol/L    Potassium 3.5 3.5 - 5.0 mmol/L    Chloride 101 98 - 107 mmol/L    CO2 29 22 - 31 mmol/L    Anion Gap, Calculation 10 5 - 18 mmol/L    Glucose 84 70 - 125 mg/dL    BUN 24 8 - 28 mg/dL    Creatinine 0.77 0.60 - 1.10 mg/dL    GFR MDRD Af Amer >60 >60 mL/min/1.73m2    GFR MDRD Non Af Amer >60 >60 mL/min/1.73m2    Bilirubin, Total 1.3 (H) 0.0 - 1.0 mg/dL    Calcium 9.6 8.5 - 10.5 mg/dL    Protein, Total 7.2 6.0 - 8.0 g/dL    Albumin 3.5 3.5 - 5.0 g/dL    Alkaline Phosphatase 84 45 - 120 U/L    AST 17 0 - 40 U/L    ALT 24 0 - 45 U/L    Narrative    Fasting Glucose reference range is 70-99 mg/dL per  American Diabetes Association (ADA) guidelines.   HM2(CBC w/o Differential)   Result Value Ref Range    WBC 6.4 4.0 - 11.0 thou/uL    RBC 4.48 3.80 - 5.40 mill/uL    Hemoglobin 13.3 12.0 - 16.0 g/dL    Hematocrit 40.3 35.0 - 47.0 %    MCV 90 80 - 100 fL    MCH 29.7 27.0 - 34.0 pg    MCHC 33.0 32.0 - 36.0 g/dL    RDW 13.0 11.0 - 14.5 %    Platelets 282 140 - 440 thou/uL    MPV 8.9 7.0 - 10.0 fL       Lipids are acceptable/improved. Normal CBC,  electrolytes, liver and kidney functions. Your potassium level is borderline, so it wouldn't be a bad idea to increase high potassium foods in your diet a bit. Recommend continue your same medications and working on high fiber, low saturated fat  diet and regular exercise. Recheck in 4-6 mos, sooner if BP is not improving.    Please call with questions or contact us using MyChart.    Sincerely,      Electronically signed by Heather Hou MD

## 2021-06-24 NOTE — TELEPHONE ENCOUNTER
RN cannot approve Refill Request    RN can NOT refill this medication because the patient is overdue for an office visit and labs  . Last office visit: Visit date not found Last Physical: Visit date not found Last MTM visit: Visit date not found Last visit same specialty: 7/27/2017 Selam Carrillo MD.  Next visit within 3 mo: Visit date not found  Next physical within 3 mo: Visit date not found      Frances Wilson, [facility] Triage/Med Refill 2/18/2019    Requested Prescriptions   Pending Prescriptions Disp Refills     indapamide (LOZOL) 2.5 MG tablet [Pharmacy Med Name: INDAPAMIDE 2.5MG TABLETS] 90 tablet 0     Sig: TAKE 1 TABLET BY MOUTH DAILY    Diuretics/Combination Diuretics Refill Protocol  Failed - 2/14/2019 10:36 AM       Failed - Visit with PCP or prescribing provider visit in past 12 months    Last office visit with prescriber/PCP: Visit date not found OR same dept: Visit date not found OR same specialty: 7/27/2017 Selam Carrillo MD  Last physical: Visit date not found Last MTM visit: Visit date not found   Next visit within 3 mo: Visit date not found  Next physical within 3 mo: Visit date not found  Prescriber OR PCP: Jitendra Ovalles MD  Last diagnosis associated with med order: 1. HTN (hypertension)  - indapamide (LOZOL) 2.5 MG tablet [Pharmacy Med Name: INDAPAMIDE 2.5MG TABLETS]; TAKE 1 TABLET BY MOUTH DAILY  Dispense: 90 tablet; Refill: 0  - lisinopril (PRINIVIL,ZESTRIL) 40 MG tablet [Pharmacy Med Name: LISINOPRIL 40MG TABLETS]; TAKE 1 TABLET(40 MG) BY MOUTH DAILY  Dispense: 90 tablet; Refill: 0    If protocol passes may refill for 12 months if within 3 months of last provider visit (or a total of 15 months).            Failed - Serum Potassium in past 12 months     No results found for: LN-POTASSIUM         Failed - Serum Sodium in past 12 months     No results found for: LN-SODIUM         Failed - Blood pressure on file in past 12 months    BP Readings from Last 1 Encounters:   07/27/17  144/90            Failed - Serum Creatinine in past 12 months     Creatinine   Date Value Ref Range Status   05/24/2017 0.80 0.60 - 1.10 mg/dL Final             lisinopril (PRINIVIL,ZESTRIL) 40 MG tablet [Pharmacy Med Name: LISINOPRIL 40MG TABLETS] 90 tablet 0     Sig: TAKE 1 TABLET(40 MG) BY MOUTH DAILY    Ace Inhibitors Refill Protocol Failed - 2/14/2019 10:36 AM       Failed - PCP or prescribing provider visit in past 12 months      Last office visit with prescriber/PCP: Visit date not found OR same dept: Visit date not found OR same specialty: 7/27/2017 Selam Carrillo MD  Last physical: Visit date not found Last MTM visit: Visit date not found   Next visit within 3 mo: Visit date not found  Next physical within 3 mo: Visit date not found  Prescriber OR PCP: Jitendra Ovalles MD  Last diagnosis associated with med order: 1. HTN (hypertension)  - indapamide (LOZOL) 2.5 MG tablet [Pharmacy Med Name: INDAPAMIDE 2.5MG TABLETS]; TAKE 1 TABLET BY MOUTH DAILY  Dispense: 90 tablet; Refill: 0  - lisinopril (PRINIVIL,ZESTRIL) 40 MG tablet [Pharmacy Med Name: LISINOPRIL 40MG TABLETS]; TAKE 1 TABLET(40 MG) BY MOUTH DAILY  Dispense: 90 tablet; Refill: 0    If protocol passes may refill for 12 months if within 3 months of last provider visit (or a total of 15 months).            Failed - Serum Potassium in past 12 months    No results found for: LN-POTASSIUM         Failed - Blood pressure filed in past 12 months    BP Readings from Last 1 Encounters:   07/27/17 144/90            Failed - Serum Creatinine in past 12 months    Creatinine   Date Value Ref Range Status   05/24/2017 0.80 0.60 - 1.10 mg/dL Final

## 2021-06-25 NOTE — TELEPHONE ENCOUNTER
Reason for Call:  Other medication change     Detailed comments: pt would like to not start the new medication Amlodipine due to what she has read about it.  She did pick it up but really would like try again to make lifestyle changes,lose weight, etc and follow up in the fall.  She knows she has tried before but would like another chance before starting another medication.    Please call her with questions     Phone Number Patient can be reached at: Home number on file [phone] (home)    Best Time: anytime    Can we leave a detailed message on this number?: Yes    Call taken on 5/27/2021 at 10:46 AM by Dax Salas

## 2021-06-25 NOTE — TELEPHONE ENCOUNTER
Reason for Call:  Medication or medication refill:    Do you use a [facility]?  Name of the pharmacy and phone number for the current request: [email or web address]    Name of the medication requested: lisinopril  90 day supply    Other request:     Can we leave a detailed message on this number? Yes    Phone number patient can be reached at: Home number on file [phone] (home)    Best Time:     Call taken on 6/3/2021 at 12:24 PM by Gretchen Mauricio

## 2021-06-25 NOTE — TELEPHONE ENCOUNTER
Medication: lisinopril 40mg  Last Date Filled: 3/3/21  Last appointment addressing medication: 5/3/21  Last B/P:  BP Readings from Last 3 Encounters:   05/03/21 (!) 150/92   06/18/20 144/84   10/15/19 (!) 156/94     Last labs pertaining to refill: 5/3/21      Pend medication and associate diagnosis before routing to Provider for review.       If patient has not been seen in over 1 year, pend 30 day supply and notify patient they are due for an appointment before any further refills.

## 2021-06-25 NOTE — TELEPHONE ENCOUNTER
RN cannot approve Refill Request    RN can NOT refill this medication PCP messaged that patient is overdue for Labs and Office Visit. Last office visit: 7/27/2017 Selam Carrillo MD Last Physical: 5/24/2017 Last MTM visit: Visit date not found Last visit same specialty: 7/27/2017 Selam Carrillo MD.  Next visit within 3 mo: Visit date not found  Next physical within 3 mo: Visit date not found      Arleen Kincaid, [facility] Triage/Med Refill 3/19/2019    Requested Prescriptions   Pending Prescriptions Disp Refills     indapamide (LOZOL) 2.5 MG tablet [Pharmacy Med Name: INDAPAMIDE 2.5MG TABLETS] 30 tablet 0     Sig: TAKE 1 TABLET BY MOUTH DAILY    Diuretics/Combination Diuretics Refill Protocol  Failed - 3/18/2019  8:58 AM       Failed - Visit with PCP or prescribing provider visit in past 12 months    Last office visit with prescriber/PCP: 7/27/2017 Selam Carrillo MD OR same dept: Visit date not found OR same specialty: 7/27/2017 Selam Carrillo MD  Last physical: 5/24/2017 Last MTM visit: Visit date not found   Next visit within 3 mo: Visit date not found  Next physical within 3 mo: Visit date not found  Prescriber OR PCP: Selam Carrillo MD  Last diagnosis associated with med order: 1. HTN (hypertension)  - indapamide (LOZOL) 2.5 MG tablet [Pharmacy Med Name: INDAPAMIDE 2.5MG TABLETS]; TAKE 1 TABLET BY MOUTH DAILY  Dispense: 30 tablet; Refill: 0  - lisinopril (PRINIVIL,ZESTRIL) 40 MG tablet [Pharmacy Med Name: LISINOPRIL 40MG TABLETS]; TAKE 1 TABLET BY MOUTH DAILY  Dispense: 30 tablet; Refill: 0    If protocol passes may refill for 12 months if within 3 months of last provider visit (or a total of 15 months).            Failed - Serum Potassium in past 12 months     No results found for: LN-POTASSIUM         Failed - Serum Sodium in past 12 months     No results found for: LN-SODIUM         Failed - Blood pressure on file in past 12 months    BP Readings from Last 1 Encounters:   07/27/17 144/90             Failed - Serum Creatinine in past 12 months     Creatinine   Date Value Ref Range Status   05/24/2017 0.80 0.60 - 1.10 mg/dL Final             lisinopril (PRINIVIL,ZESTRIL) 40 MG tablet [Pharmacy Med Name: LISINOPRIL 40MG TABLETS] 30 tablet 0     Sig: TAKE 1 TABLET BY MOUTH DAILY    Ace Inhibitors Refill Protocol Failed - 3/18/2019  8:58 AM       Failed - PCP or prescribing provider visit in past 12 months      Last office visit with prescriber/PCP: 7/27/2017 Selam Carrillo MD OR same dept: Visit date not found OR same specialty: 7/27/2017 Selam Carrillo MD  Last physical: 5/24/2017 Last MTM visit: Visit date not found   Next visit within 3 mo: Visit date not found  Next physical within 3 mo: Visit date not found  Prescriber OR PCP: Selam Carrillo MD  Last diagnosis associated with med order: 1. HTN (hypertension)  - indapamide (LOZOL) 2.5 MG tablet [Pharmacy Med Name: INDAPAMIDE 2.5MG TABLETS]; TAKE 1 TABLET BY MOUTH DAILY  Dispense: 30 tablet; Refill: 0  - lisinopril (PRINIVIL,ZESTRIL) 40 MG tablet [Pharmacy Med Name: LISINOPRIL 40MG TABLETS]; TAKE 1 TABLET BY MOUTH DAILY  Dispense: 30 tablet; Refill: 0    If protocol passes may refill for 12 months if within 3 months of last provider visit (or a total of 15 months).            Failed - Serum Potassium in past 12 months    No results found for: LN-POTASSIUM         Failed - Blood pressure filed in past 12 months    BP Readings from Last 1 Encounters:   07/27/17 144/90            Failed - Serum Creatinine in past 12 months    Creatinine   Date Value Ref Range Status   05/24/2017 0.80 0.60 - 1.10 mg/dL Final

## 2021-10-04 ENCOUNTER — IMMUNIZATION (OUTPATIENT)
Dept: NURSING | Facility: CLINIC | Age: 76
End: 2021-10-04
Payer: COMMERCIAL

## 2021-10-04 PROCEDURE — 91300 PR COVID VAC PFIZER DIL RECON 30 MCG/0.3 ML IM: CPT

## 2021-10-04 PROCEDURE — 0004A PR COVID VAC PFIZER DIL RECON 30 MCG/0.3 ML IM: CPT

## 2021-10-30 ENCOUNTER — IMMUNIZATION (OUTPATIENT)
Dept: FAMILY MEDICINE | Facility: CLINIC | Age: 76
End: 2021-10-30
Payer: COMMERCIAL

## 2021-10-30 PROCEDURE — G0008 ADMIN INFLUENZA VIRUS VAC: HCPCS

## 2021-10-30 PROCEDURE — 90662 IIV NO PRSV INCREASED AG IM: CPT

## 2021-12-02 DIAGNOSIS — I10 HTN (HYPERTENSION): ICD-10-CM

## 2021-12-02 RX ORDER — LISINOPRIL 40 MG/1
40 TABLET ORAL DAILY
Qty: 90 TABLET | Refills: 1 | Status: SHIPPED | OUTPATIENT
Start: 2021-12-02 | End: 2022-05-19

## 2021-12-02 NOTE — TELEPHONE ENCOUNTER
PATIENT OUT OF MEDICATION      Medication: lisinopril 40mg  Last Date Filled: 6/3/21 #90 RF 1  Last appointment addressing medication: 5/3/21    Future appt 12/6/21    Last B/P:  BP Readings from Last 3 Encounters:   05/03/21 (!) 150/92   06/18/20 (!) 144/84   10/15/19 (!) 156/94

## 2021-12-06 ENCOUNTER — VIRTUAL VISIT (OUTPATIENT)
Dept: FAMILY MEDICINE | Facility: CLINIC | Age: 76
End: 2021-12-06
Payer: COMMERCIAL

## 2021-12-06 DIAGNOSIS — Z11.59 NEED FOR HEPATITIS C SCREENING TEST: ICD-10-CM

## 2021-12-06 DIAGNOSIS — Z78.0 MENOPAUSE: ICD-10-CM

## 2021-12-06 DIAGNOSIS — I10 ESSENTIAL HYPERTENSION: Primary | ICD-10-CM

## 2021-12-06 DIAGNOSIS — E78.2 MIXED HYPERLIPIDEMIA: ICD-10-CM

## 2021-12-06 PROCEDURE — 99443 PR PHYSICIAN TELEPHONE EVALUATION 21-30 MIN: CPT | Performed by: FAMILY MEDICINE

## 2021-12-06 NOTE — PROGRESS NOTES
"Little is a 76 year old who is being evaluated via a billable telephone visit.      What phone number would you like to be contacted at? [phone]  How would you like to obtain your AVS? Via mail     Assessment & Plan     Little was seen today for medication therapy management.    Diagnoses and all orders for this visit:    Essential hypertension  -     Comprehensive metabolic panel (BMP + Alb, Alk Phos, ALT, AST, Total. Bili, TP); Future    Mixed hyperlipidemia  -     Lipid panel reflex to direct LDL Fasting; Future    Need for hepatitis C screening test  -     Hepatitis C Screen Reflex to HCV RNA Quant and Genotype; Future    Menopause  -     DEXA HIP/PELVIS/SPINE - Future; Future    Other orders  -     REVIEW OF HEALTH MAINTENANCE PROTOCOL ORDERS        Fasting labs were ordered, and she will return for those in the next few weeks. Blood pressure has been under adequate control. We reviewed her current medications and she will continue the same pending additional lab results. We reviewed dietary recommendations, including low salt and high fiber diet, and recommendations for regular exercise/activity. She will plan to follow up in 4-6 mos for repeat fasting labs and med check, sooner if any difficulties.          BMI:   Estimated body mass index is 30.2 kg/m  as calculated from the following:    Height as of 5/3/21: 1.613 m (5' 3.5\").    Weight as of 5/3/21: 78.6 kg (173 lb 3 oz).   No follow-ups on file.    Heather Hou MD  [facility]   Little is a 76 year old who presents for the following health issues     HPI        Little Bryan is a 76 year old female who was contacted for follow up of hypertension and hyperlipidemia. Compliance with treatment has been good. Patient denies muscle pain associated with her medications. She is currently taking lipitor 20 mg, lisinopril 40 mg and indapamide 2.5 mg. Current side effects include: none. She did mot tolerate " amlodipine in the spring. Associated signs and symptoms: none. Denies chest pain, dyspnea, palpitations, peripheral edema and tiredness/fatigue. The patient reports sporadic exercise. Weight trend: has been stable.              Previous history of cardiac disease includes: none. BP has been normal at home - 143/77 - 80, 147/88 -74. 148/75 - 87.       Review of Systems   Constitutional, HEENT, cardiovascular, pulmonary, GI, , musculoskeletal, neuro, skin, endocrine and psych systems are negative, except as otherwise noted.      Objective           Vitals:  No vitals were obtained today due to virtual visit.    Physical Exam   healthy, alert and no distress  PSYCH: Alert and oriented times 3; coherent speech, normal   rate and volume, able to articulate logical thoughts, able   to abstract reason, no tangential thoughts, no hallucinations   or delusions  Her affect is normal  RESP: No cough, no audible wheezing, able to talk in full sentences  Remainder of exam unable to be completed due to telephone visits              Phone call duration: 24 minutes

## 2022-02-28 DIAGNOSIS — E78.2 MIXED HYPERLIPIDEMIA: ICD-10-CM

## 2022-02-28 DIAGNOSIS — I10 HTN (HYPERTENSION): ICD-10-CM

## 2022-03-02 RX ORDER — ATORVASTATIN CALCIUM 20 MG/1
TABLET, FILM COATED ORAL
Qty: 90 TABLET | Refills: 3 | Status: SHIPPED | OUTPATIENT
Start: 2022-03-02 | End: 2023-02-17

## 2022-03-02 NOTE — TELEPHONE ENCOUNTER
"Routing refill request to provider for review/approval because:  Labs out of range:  Blood pressure    Last Written Prescription Date:  11/30/2021  Last Fill Quantity: 90,  # refills: 0   Last office visit provider:  12/6/2021     Requested Prescriptions   Pending Prescriptions Disp Refills     indapamide (LOZOL) 2.5 MG tablet [Pharmacy Med Name: INDAPAMIDE 2.5MG TABLETS] 90 tablet 0     Sig: TAKE 1 TABLET(2.5 MG) BY MOUTH DAILY       Diuretics (Including Combos) Protocol Failed - 2/28/2022  9:21 AM        Failed - Blood pressure under 140/90 in past 12 months     BP Readings from Last 3 Encounters:   05/03/21 (!) 150/92   06/18/20 (!) 144/84   10/15/19 (!) 156/94                 Passed - Recent (12 mo) or future (30 days) visit within the authorizing provider's specialty     Patient has had an office visit with the authorizing provider or a provider within the authorizing providers department within the previous 12 mos or has a future within next 30 days. See \"Patient Info\" tab in inbasket, or \"Choose Columns\" in Meds & Orders section of the refill encounter.              Passed - Medication is active on med list        Passed - Patient is age 18 or older        Passed - No active pregancy on record        Passed - Normal serum creatinine on file in past 12 months     Recent Labs   Lab Test 05/03/21  1234   CR 0.77              Passed - Normal serum potassium on file in past 12 months     Recent Labs   Lab Test 05/03/21  1234   POTASSIUM 3.5                    Passed - Normal serum sodium on file in past 12 months     Recent Labs   Lab Test 05/03/21  1234                 Passed - No positive pregnancy test in past 12 months        Last Written Prescription Date:  11/30/2021  Last Fill Quantity: 90,  # refills: 0   Last office visit provider:  12/6/2021            atorvastatin (LIPITOR) 20 MG tablet [Pharmacy Med Name: ATORVASTATIN 20MG TABLETS] 90 tablet 0     Sig: TAKE 1 TABLET(20 MG) BY MOUTH DAILY       " "Statins Protocol Passed - 2/28/2022  9:21 AM        Passed - LDL on file in past 12 months     Recent Labs   Lab Test 05/03/21  1234   LDL 93             Passed - No abnormal creatine kinase in past 12 months     No lab results found.             Passed - Recent (12 mo) or future (30 days) visit within the authorizing provider's specialty     Patient has had an office visit with the authorizing provider or a provider within the authorizing providers department within the previous 12 mos or has a future within next 30 days. See \"Patient Info\" tab in inbasket, or \"Choose Columns\" in Meds & Orders section of the refill encounter.              Passed - Medication is active on med list        Passed - Patient is age 18 or older        Passed - No active pregnancy on record        Passed - No positive pregnancy test in past 12 months             Radha Lugo RN 03/02/22 9:55 AM  "

## 2022-03-03 RX ORDER — INDAPAMIDE 2.5 MG/1
TABLET ORAL
Qty: 90 TABLET | Refills: 3 | Status: SHIPPED | OUTPATIENT
Start: 2022-03-03 | End: 2023-02-14

## 2022-04-21 ENCOUNTER — IMMUNIZATION (OUTPATIENT)
Dept: NURSING | Facility: CLINIC | Age: 77
End: 2022-04-21
Payer: COMMERCIAL

## 2022-04-21 PROCEDURE — 0054A COVID-19,PF,PFIZER (12+ YRS): CPT

## 2022-04-21 PROCEDURE — 91305 COVID-19,PF,PFIZER (12+ YRS): CPT

## 2022-05-18 ENCOUNTER — TELEPHONE (OUTPATIENT)
Dept: FAMILY MEDICINE | Facility: CLINIC | Age: 77
End: 2022-05-18
Payer: COMMERCIAL

## 2022-05-18 DIAGNOSIS — I10 HTN (HYPERTENSION): ICD-10-CM

## 2022-05-18 DIAGNOSIS — I10 ESSENTIAL HYPERTENSION: Primary | ICD-10-CM

## 2022-05-19 RX ORDER — LISINOPRIL 40 MG/1
40 TABLET ORAL DAILY
Qty: 90 TABLET | Refills: 2 | Status: SHIPPED | OUTPATIENT
Start: 2022-05-19 | End: 2023-02-17

## 2022-05-19 NOTE — TELEPHONE ENCOUNTER
Pt called concerned as she just received letter about Dr. Hou leaving.   She had already scheduled an appt, and I reassured her that was fine. She is worried about her refills. I explained Dr Hou will refill since she is seeing her one last time.    She then set up a future appt with Jennie to establish care.

## 2022-05-25 ENCOUNTER — TRANSFERRED RECORDS (OUTPATIENT)
Dept: HEALTH INFORMATION MANAGEMENT | Facility: CLINIC | Age: 77
End: 2022-05-25
Payer: COMMERCIAL

## 2022-10-12 ENCOUNTER — IMMUNIZATION (OUTPATIENT)
Dept: FAMILY MEDICINE | Facility: CLINIC | Age: 77
End: 2022-10-12
Payer: COMMERCIAL

## 2022-10-12 PROCEDURE — G0008 ADMIN INFLUENZA VIRUS VAC: HCPCS

## 2022-10-12 PROCEDURE — 90662 IIV NO PRSV INCREASED AG IM: CPT

## 2022-10-26 ENCOUNTER — IMMUNIZATION (OUTPATIENT)
Dept: NURSING | Facility: CLINIC | Age: 77
End: 2022-10-26
Payer: COMMERCIAL

## 2022-10-26 PROCEDURE — 91312 COVID-19,PF,PFIZER BOOSTER BIVALENT: CPT

## 2022-10-26 PROCEDURE — 0124A COVID-19,PF,PFIZER BOOSTER BIVALENT: CPT

## 2023-02-13 DIAGNOSIS — E78.2 MIXED HYPERLIPIDEMIA: ICD-10-CM

## 2023-02-14 DIAGNOSIS — I10 HTN (HYPERTENSION): ICD-10-CM

## 2023-02-14 DIAGNOSIS — I10 ESSENTIAL HYPERTENSION: ICD-10-CM

## 2023-02-14 NOTE — TELEPHONE ENCOUNTER
Refill Request  Medication name: Pending Prescriptions:                       Disp   Refills    lisinopril (ZESTRIL) 40 MG tablet         90 tab*2            Sig: Take 1 tablet (40 mg) by mouth daily    indapamide (LOZOL) 2.5 MG tablet          90 tab*3            Sig: Take 1 tablet (2.5 mg) by mouth daily    Requested [facility]acy: [facility]

## 2023-02-15 NOTE — TELEPHONE ENCOUNTER
"Former patient of [place] & has not established care with another provider.  Please assign refill request to covering provider per clinic standard process.      Routing refill request to provider for review/approval because:  Labs not current:  ldl  Patient needs to be seen because it has been more than 1 year since last office visit.    Last Written Prescription Date:  3/2/22  Last Fill Quantity: 90,  # refills: 3   Last office visit provider:  12/6/21     Requested Prescriptions   Pending Prescriptions Disp Refills     atorvastatin (LIPITOR) 20 MG tablet [Pharmacy Med Name: ATORVASTATIN 20MG TABLETS] 90 tablet 3     Sig: TAKE 1 TABLET(20 MG) BY MOUTH DAILY       Statins Protocol Failed - 2/13/2023 11:50 AM        Failed - LDL on file in past 12 months     Recent Labs   Lab Test 05/03/21  1234   LDL 93             Failed - Recent (12 mo) or future (30 days) visit within the authorizing provider's specialty     Patient has had an office visit with the authorizing provider or a provider within the authorizing providers department within the previous 12 mos or has a future within next 30 days. See \"Patient Info\" tab in inbasket, or \"Choose Columns\" in Meds & Orders section of the refill encounter.              Passed - No abnormal creatine kinase in past 12 months     No lab results found.             Passed - Medication is active on med list        Passed - Patient is age 18 or older        Passed - No active pregnancy on record        Passed - No positive pregnancy test in past 12 months             Adrienne Ansari, RN 02/15/23 4:18 AM  "

## 2023-02-16 NOTE — TELEPHONE ENCOUNTER
"Former patient of Dr. Heather Hou & has not established care with another provider.  Please assign refill request to covering provider per clinic standard process.      Routing refill request to provider for review/approval because:  Labs not current:  CR, K, NA  Patient needs to be seen because it has been more than 1 year since last office visit.  Blood pressure failed and out of date    Last Written Prescription Date:  5/19/2022  Last Fill Quantity: 90,  # refills: 2   Last office visit provider:  12/6/2021     Requested Prescriptions   Pending Prescriptions Disp Refills     lisinopril (ZESTRIL) 40 MG tablet 90 tablet 2     Sig: Take 1 tablet (40 mg) by mouth daily       ACE Inhibitors (Including Combos) Protocol Failed - 2/16/2023  3:03 PM        Failed - Blood pressure under 140/90 in past 12 months     BP Readings from Last 3 Encounters:   05/03/21 (!) 150/92   06/18/20 (!) 144/84   10/15/19 (!) 156/94                 Failed - Recent (12 mo) or future (30 days) visit within the authorizing provider's specialty     Patient has had an office visit with the authorizing provider or a provider within the authorizing providers department within the previous 12 mos or has a future within next 30 days. See \"Patient Info\" tab in inbasket, or \"Choose Columns\" in Meds & Orders section of the refill encounter.              Failed - Normal serum creatinine on file in past 12 months     Recent Labs   Lab Test 05/03/21  1234   CR 0.77       Ok to refill medication if creatinine is low          Failed - Normal serum potassium on file in past 12 months     Recent Labs   Lab Test 05/03/21  1234   POTASSIUM 3.5             Passed - Medication is active on med list        Passed - Patient is age 18 or older        Passed - No active pregnancy on record        Passed - No positive pregnancy test within past 12 months        Last Written Prescription Date:  3/3/2022  Last Fill Quantity: 90,  # refills: 3   Last office visit " "provider:  12/6/2021        indapamide (LOZOL) 2.5 MG tablet 90 tablet 3     Sig: Take 1 tablet (2.5 mg) by mouth daily       Diuretics (Including Combos) Protocol Failed - 2/16/2023  3:03 PM        Failed - Blood pressure under 140/90 in past 12 months     BP Readings from Last 3 Encounters:   05/03/21 (!) 150/92   06/18/20 (!) 144/84   10/15/19 (!) 156/94                 Failed - Recent (12 mo) or future (30 days) visit within the authorizing provider's specialty     Patient has had an office visit with the authorizing provider or a provider within the authorizing providers department within the previous 12 mos or has a future within next 30 days. See \"Patient Info\" tab in inbasket, or \"Choose Columns\" in Meds & Orders section of the refill encounter.              Failed - Normal serum creatinine on file in past 12 months     Recent Labs   Lab Test 05/03/21  1234   CR 0.77              Failed - Normal serum potassium on file in past 12 months     Recent Labs   Lab Test 05/03/21  1234   POTASSIUM 3.5                    Failed - Normal serum sodium on file in past 12 months     Recent Labs   Lab Test 05/03/21  1234                 Passed - Medication is active on med list        Passed - Patient is age 18 or older        Passed - No active pregancy on record        Passed - No positive pregnancy test in past 12 months             Gillian Trevino RN 02/16/23 3:03 PM  "

## 2023-02-17 RX ORDER — INDAPAMIDE 2.5 MG/1
2.5 TABLET ORAL DAILY
Qty: 90 TABLET | Refills: 0 | Status: SHIPPED | OUTPATIENT
Start: 2023-02-17 | End: 2023-04-27

## 2023-02-17 RX ORDER — ATORVASTATIN CALCIUM 20 MG/1
TABLET, FILM COATED ORAL
Qty: 90 TABLET | Refills: 0 | Status: SHIPPED | OUTPATIENT
Start: 2023-02-17 | End: 2023-04-27

## 2023-02-17 RX ORDER — LISINOPRIL 40 MG/1
40 TABLET ORAL DAILY
Qty: 90 TABLET | Refills: 0 | Status: SHIPPED | OUTPATIENT
Start: 2023-02-17 | End: 2023-04-27

## 2023-02-17 NOTE — TELEPHONE ENCOUNTER
Pt was scheduled to see Dr Hou but then she left and then had an appt to see NP and then she left.  That is why she hasn't been seen in so long.  She is going to schedule appt.    She would like to see Dr Denia Cheney-is she taking new pts?  Please call pt to schedule if so.

## 2023-04-27 ENCOUNTER — OFFICE VISIT (OUTPATIENT)
Dept: FAMILY MEDICINE | Facility: CLINIC | Age: 78
End: 2023-04-27
Payer: COMMERCIAL

## 2023-04-27 VITALS
RESPIRATION RATE: 16 BRPM | TEMPERATURE: 98.5 F | HEIGHT: 62 IN | BODY MASS INDEX: 32.76 KG/M2 | DIASTOLIC BLOOD PRESSURE: 88 MMHG | OXYGEN SATURATION: 97 % | HEART RATE: 78 BPM | WEIGHT: 178 LBS | SYSTOLIC BLOOD PRESSURE: 152 MMHG

## 2023-04-27 DIAGNOSIS — E78.2 MIXED HYPERLIPIDEMIA: ICD-10-CM

## 2023-04-27 DIAGNOSIS — I10 PRIMARY HYPERTENSION: Primary | ICD-10-CM

## 2023-04-27 DIAGNOSIS — C43.62 MALIGNANT MELANOMA OF SKIN OF LEFT UPPER EXTREMITY, INCLUDING SHOULDER (H): ICD-10-CM

## 2023-04-27 LAB
ERYTHROCYTE [DISTWIDTH] IN BLOOD BY AUTOMATED COUNT: 13.4 % (ref 10–15)
HCT VFR BLD AUTO: 40.8 % (ref 35–47)
HGB BLD-MCNC: 13.3 G/DL (ref 11.7–15.7)
MCH RBC QN AUTO: 29.8 PG (ref 26.5–33)
MCHC RBC AUTO-ENTMCNC: 32.6 G/DL (ref 31.5–36.5)
MCV RBC AUTO: 92 FL (ref 78–100)
PLATELET # BLD AUTO: 278 10E3/UL (ref 150–450)
RBC # BLD AUTO: 4.46 10E6/UL (ref 3.8–5.2)
WBC # BLD AUTO: 6.3 10E3/UL (ref 4–11)

## 2023-04-27 PROCEDURE — 80061 LIPID PANEL: CPT | Performed by: NURSE PRACTITIONER

## 2023-04-27 PROCEDURE — 36415 COLL VENOUS BLD VENIPUNCTURE: CPT | Performed by: NURSE PRACTITIONER

## 2023-04-27 PROCEDURE — 99214 OFFICE O/P EST MOD 30 MIN: CPT | Performed by: NURSE PRACTITIONER

## 2023-04-27 PROCEDURE — 85027 COMPLETE CBC AUTOMATED: CPT | Performed by: NURSE PRACTITIONER

## 2023-04-27 PROCEDURE — 84460 ALANINE AMINO (ALT) (SGPT): CPT | Performed by: NURSE PRACTITIONER

## 2023-04-27 PROCEDURE — 80048 BASIC METABOLIC PNL TOTAL CA: CPT | Performed by: NURSE PRACTITIONER

## 2023-04-27 RX ORDER — LISINOPRIL 40 MG/1
40 TABLET ORAL DAILY
Qty: 90 TABLET | Refills: 1 | Status: SHIPPED | OUTPATIENT
Start: 2023-04-27 | End: 2023-11-10

## 2023-04-27 RX ORDER — ATORVASTATIN CALCIUM 20 MG/1
20 TABLET, FILM COATED ORAL DAILY
Qty: 90 TABLET | Refills: 3 | Status: SHIPPED | OUTPATIENT
Start: 2023-04-27 | End: 2024-05-20

## 2023-04-27 RX ORDER — INDAPAMIDE 2.5 MG/1
2.5 TABLET ORAL DAILY
Qty: 90 TABLET | Refills: 1 | Status: SHIPPED | OUTPATIENT
Start: 2023-04-27 | End: 2023-11-10

## 2023-04-27 ASSESSMENT — PAIN SCALES - GENERAL: PAINLEVEL: NO PAIN (0)

## 2023-04-27 NOTE — PROGRESS NOTES
"      Subjective   Little is a 78 year old, presenting for the following health issues:  Establish Care and Recheck Medication        4/27/2023     1:30 PM   Additional Questions   Roomed by Zahraa GENTILE CMA     History of Present Illness       Hypertension: She presents for follow up of hypertension.  She does not check blood pressure  regularly outside of the clinic. Outside blood pressures have been over 140/90. She follows a low salt diet.     She eats 2-3 servings of fruits and vegetables daily.She consumes 1 sweetened beverage(s) daily.She exercises with enough effort to increase her heart rate 9 or less minutes per day.  She exercises with enough effort to increase her heart rate 7 days per week.   She is taking medications regularly.       Review of Systems   Constitutional, HEENT, cardiovascular, pulmonary, gi and gu systems are negative, except as otherwise noted.      Objective    BP (!) 154/96 (BP Location: Left arm, Patient Position: Sitting, Cuff Size: Adult Regular)   Pulse 78   Temp 98.5  F (36.9  C) (Oral)   Resp 16   Ht 1.562 m (5' 1.5\")   Wt 80.7 kg (178 lb)   SpO2 97%   Breastfeeding No   BMI 33.09 kg/m    Body mass index is 33.09 kg/m .  Physical Exam   GENERAL: healthy, alert and no distress  EYES: Eyes grossly normal to inspection, PERRL and conjunctivae and sclerae normal  HENT: normal cephalic/atraumatic, nose and mouth without ulcers or lesions, oropharynx clear, oral mucous membranes moist and moderate cerumen right canal- patient declined ear lavage  NECK: no adenopathy, no asymmetry, masses, or scars and thyroid normal to palpation  RESP: lungs clear to auscultation - no rales, rhonchi or wheezes  CV: regular rate and rhythm, normal S1 S2, no S3 or S4, no murmur, click or rub, no peripheral edema and peripheral pulses strong  ABDOMEN: soft, nontender, no hepatosplenomegaly, no masses and bowel sounds normal  MS: no gross musculoskeletal defects noted, no edema  SKIN: multiple moles, " several are mildly irregular   NEURO: Normal strength and tone, mentation intact and speech normal  PSYCH: mentation appears normal, affect normal/bright    Labs pending    A/P:  (I10) Primary hypertension  (primary encounter diagnosis)  Comment: Patient declined increasing diuretic or adding another blood pressure medication at this time.  Discussed diet, exercise, weight management.   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

## 2023-04-27 NOTE — PATIENT INSTRUCTIONS
Please schedule a dermatology appointment at your convenience.     Patient Education   Personalized Prevention Plan  You are due for the preventive services outlined below.  Your care team is available to assist you in scheduling these services.  If you have already completed any of these items, please share that information with your care team to update in your medical record.  Health Maintenance Due   Topic Date Due    Osteoporosis Screening  Never done    Hepatitis C Screening  Never done    Zoster (Shingles) Vaccine (1 of 2) Never done    Pneumococcal Vaccine (3 - PPSV23 if available, else PCV20) 10/20/2015    ANNUAL REVIEW OF HM ORDERS  12/19/2022

## 2023-04-27 NOTE — LETTER
April 28, 2023      Little Bryan  [address]   [facility] [place] [id number]        Dear ,    We are writing to inform you of your test results.    Calcium is a little high- recheck in a few months.  Everything else looks good.        Resulted Orders   Basic metabolic panel   Result Value Ref Range    Sodium 140 136 - 145 mmol/L    Potassium 4.0 3.4 - 5.3 mmol/L      Comment:      Specimen slightly hemolyzed, potassium may be falsely elevated.      Chloride 101 98 - 107 mmol/L    Carbon Dioxide (CO2) 25 22 - 29 mmol/L    Anion Gap 14 7 - 15 mmol/L    Urea Nitrogen 26.3 (H) 8.0 - 23.0 mg/dL    Creatinine 0.82 0.51 - 0.95 mg/dL    Calcium 10.5 (H) 8.8 - 10.2 mg/dL    Glucose 84 70 - 99 mg/dL    GFR Estimate 73 >60 mL/min/1.73m2      Comment:      eGFR calculated using 2021 CKD-EPI equation.   Lipid panel reflex to direct LDL Fasting   Result Value Ref Range    Cholesterol 176 <200 mg/dL    Triglycerides 124 <150 mg/dL    Direct Measure HDL 65 >=50 mg/dL    LDL Cholesterol Calculated 86 <=100 mg/dL    Non HDL Cholesterol 111 <130 mg/dL    Narrative    Cholesterol  Desirable:  <200 mg/dL    Triglycerides  Normal:  Less than 150 mg/dL  Borderline High:  150-199 mg/dL  High:  200-499 mg/dL  Very High:  Greater than or equal to 500 mg/dL    Direct Measure HDL  Female:  Greater than or equal to 50 mg/dL   Male:  Greater than or equal to 40 mg/dL    LDL Cholesterol  Desirable:  <100mg/dL  Above Desirable:  100-129 mg/dL   Borderline High:  130-159 mg/dL   High:  160-189 mg/dL   Very High:  >= 190 mg/dL    Non HDL Cholesterol  Desirable:  130 mg/dL  Above Desirable:  130-159 mg/dL  Borderline High:  160-189 mg/dL  High:  190-219 mg/dL  Very High:  Greater than or equal to 220 mg/dL   ALT   Result Value Ref Range    ALT 20 10 - 35 U/L   CBC with platelets   Result Value Ref Range    WBC Count 6.3 4.0 - 11.0 10e3/uL    RBC Count 4.46 3.80 - 5.20 10e6/uL    Hemoglobin 13.3 11.7 - 15.7 g/dL    Hematocrit 40.8  35.0 - 47.0 %    MCV 92 78 - 100 fL    MCH 29.8 26.5 - 33.0 pg    MCHC 32.6 31.5 - 36.5 g/dL    RDW 13.4 10.0 - 15.0 %    Platelet Count 278 150 - 450 10e3/uL       If you have any questions or concerns, please call the clinic at the number listed above.       Sincerely,      Reina Larson CNP/ks

## 2023-04-28 LAB
ALT SERPL W P-5'-P-CCNC: 20 U/L (ref 10–35)
ANION GAP SERPL CALCULATED.3IONS-SCNC: 14 MMOL/L (ref 7–15)
BUN SERPL-MCNC: 26.3 MG/DL (ref 8–23)
CALCIUM SERPL-MCNC: 10.5 MG/DL (ref 8.8–10.2)
CHLORIDE SERPL-SCNC: 101 MMOL/L (ref 98–107)
CHOLEST SERPL-MCNC: 176 MG/DL
CREAT SERPL-MCNC: 0.82 MG/DL (ref 0.51–0.95)
DEPRECATED HCO3 PLAS-SCNC: 25 MMOL/L (ref 22–29)
GFR SERPL CREATININE-BSD FRML MDRD: 73 ML/MIN/1.73M2
GLUCOSE SERPL-MCNC: 84 MG/DL (ref 70–99)
HDLC SERPL-MCNC: 65 MG/DL
LDLC SERPL CALC-MCNC: 86 MG/DL
NONHDLC SERPL-MCNC: 111 MG/DL
POTASSIUM SERPL-SCNC: 4 MMOL/L (ref 3.4–5.3)
SODIUM SERPL-SCNC: 140 MMOL/L (ref 136–145)
TRIGL SERPL-MCNC: 124 MG/DL

## 2023-06-12 ENCOUNTER — TRANSFERRED RECORDS (OUTPATIENT)
Dept: HEALTH INFORMATION MANAGEMENT | Facility: CLINIC | Age: 78
End: 2023-06-12
Payer: COMMERCIAL

## 2023-07-07 ENCOUNTER — TRANSFERRED RECORDS (OUTPATIENT)
Dept: HEALTH INFORMATION MANAGEMENT | Facility: CLINIC | Age: 78
End: 2023-07-07
Payer: COMMERCIAL

## 2023-11-07 ENCOUNTER — IMMUNIZATION (OUTPATIENT)
Dept: NURSING | Facility: CLINIC | Age: 78
End: 2023-11-07
Payer: COMMERCIAL

## 2023-11-07 PROCEDURE — 90480 ADMN SARSCOV2 VAC 1/ONLY CMP: CPT

## 2023-11-07 PROCEDURE — 91320 SARSCV2 VAC 30MCG TRS-SUC IM: CPT

## 2023-11-10 DIAGNOSIS — I10 PRIMARY HYPERTENSION: ICD-10-CM

## 2023-11-10 RX ORDER — INDAPAMIDE 2.5 MG/1
2.5 TABLET ORAL DAILY
Qty: 90 TABLET | Refills: 0 | Status: SHIPPED | OUTPATIENT
Start: 2023-11-10 | End: 2024-02-13

## 2023-11-10 RX ORDER — LISINOPRIL 40 MG/1
40 TABLET ORAL DAILY
Qty: 90 TABLET | Refills: 0 | Status: SHIPPED | OUTPATIENT
Start: 2023-11-10 | End: 2024-02-13

## 2024-05-17 ENCOUNTER — TELEPHONE (OUTPATIENT)
Dept: FAMILY MEDICINE | Facility: CLINIC | Age: 79
End: 2024-05-17
Payer: COMMERCIAL

## 2024-05-17 NOTE — TELEPHONE ENCOUNTER
Patient called back and was wondering about changing appt. Writer had received a response back via secure chat from MATHEW Saucedo, which stated it was ok to be virtual. Writer changed appt from in person due to telephone due to patient not having a camera.

## 2024-05-17 NOTE — TELEPHONE ENCOUNTER
General Call    Contacts         Type Contact Phone/Fax    05/17/2024 09:49 AM CDT Phone (Incoming) IrvinGmLittle SHANKAR (Self) [phone] (H)          Reason for Call:  Changing appt to virtual    What are your questions or concerns:  Patient is calling asking if her visit on Monday could be changed to a telephone visit. Patient stated she doesn't have a camera on her phone and is uncomfortable driving her car all the way to [facility] because its an old car.     Okay to leave a detailed message?: Yes at Home number on file [phone] (home)

## 2024-05-20 ENCOUNTER — VIRTUAL VISIT (OUTPATIENT)
Dept: FAMILY MEDICINE | Facility: CLINIC | Age: 79
End: 2024-05-20
Payer: COMMERCIAL

## 2024-05-20 DIAGNOSIS — E78.2 MIXED HYPERLIPIDEMIA: ICD-10-CM

## 2024-05-20 DIAGNOSIS — I10 PRIMARY HYPERTENSION: ICD-10-CM

## 2024-05-20 PROCEDURE — 99442 PR PHYSICIAN TELEPHONE EVALUATION 11-20 MIN: CPT | Mod: 93 | Performed by: NURSE PRACTITIONER

## 2024-05-20 RX ORDER — LISINOPRIL 40 MG/1
40 TABLET ORAL DAILY
Qty: 90 TABLET | Refills: 0 | Status: SHIPPED | OUTPATIENT
Start: 2024-05-20 | End: 2024-08-15

## 2024-05-20 RX ORDER — ATORVASTATIN CALCIUM 20 MG/1
20 TABLET, FILM COATED ORAL DAILY
Qty: 90 TABLET | Refills: 0 | Status: SHIPPED | OUTPATIENT
Start: 2024-05-20 | End: 2024-08-15

## 2024-05-20 RX ORDER — INDAPAMIDE 2.5 MG/1
2.5 TABLET ORAL DAILY
Qty: 90 TABLET | Refills: 0 | Status: SHIPPED | OUTPATIENT
Start: 2024-05-20 | End: 2024-08-15

## 2024-05-20 NOTE — PROGRESS NOTES
Little is a 79 year old who is being evaluated via a billable telephone visit.    What phone number would you like to be contacted at?   How would you like to obtain your AVS? Mail a copy  Originating Location (pt. Location): Home    Distant Location (provider location):  On-site      Subjective   Little is a 79 year old, presenting for the following health issues:  Recheck Medication      5/20/2024    10:52 AM   Additional Questions   Roomed by  lpn     History of Present Illness       Reason for visit:  Refill 3 medication    She eats 2-3 servings of fruits and vegetables daily.She consumes 1 sweetened beverage(s) daily.She exercises with enough effort to increase her heart rate 10 to 19 minutes per day.  She exercises with enough effort to increase her heart rate 7 days per week.   She is taking medications regularly.       Hyperlipidemia Follow-Up    Are you regularly taking any medication or supplement to lower your cholesterol?   Yes- Lipitor  Are you having muscle aches or other side effects that you think could be caused by your cholesterol lowering medication?  No    Hypertension Follow-up    Do you check your blood pressure regularly outside of the clinic? Yes   Are you following a low salt diet? Yes  Are your blood pressures ever more than 140 on the top number (systolic) OR more   than 90 on the bottom number (diastolic), for example 140/90? Yes      Review of Systems  Constitutional, HEENT, cardiovascular, pulmonary, gi and gu systems are negative, except as otherwise noted.      Objective    Vitals - Patient Reported  Weight (Patient Reported): 80.3 kg (177 lb)  Pain Score: No Pain (1)  Pain Loc: Low Back        Physical Exam   General: Alert and no distress //Respiratory: No audible wheeze, cough, or shortness of breath // Psychiatric:  Appropriate affect, tone, and pace of words      Office Visit on 04/27/2023   Component Date Value Ref Range Status    Sodium 04/27/2023 140  136 - 145 mmol/L  Final    Potassium 04/27/2023 4.0  3.4 - 5.3 mmol/L Final    Specimen slightly hemolyzed, potassium may be falsely elevated.      Chloride 04/27/2023 101  98 - 107 mmol/L Final    Carbon Dioxide (CO2) 04/27/2023 25  22 - 29 mmol/L Final    Anion Gap 04/27/2023 14  7 - 15 mmol/L Final    Urea Nitrogen 04/27/2023 26.3 (H)  8.0 - 23.0 mg/dL Final    Creatinine 04/27/2023 0.82  0.51 - 0.95 mg/dL Final    Calcium 04/27/2023 10.5 (H)  8.8 - 10.2 mg/dL Final    Glucose 04/27/2023 84  70 - 99 mg/dL Final    GFR Estimate 04/27/2023 73  >60 mL/min/1.73m2 Final    eGFR calculated using 2021 CKD-EPI equation.    Cholesterol 04/27/2023 176  <200 mg/dL Final    Triglycerides 04/27/2023 124  <150 mg/dL Final    Direct Measure HDL 04/27/2023 65  >=50 mg/dL Final    LDL Cholesterol Calculated 04/27/2023 86  <=100 mg/dL Final    Non HDL Cholesterol 04/27/2023 111  <130 mg/dL Final    ALT 04/27/2023 20  10 - 35 U/L Final    WBC Count 04/27/2023 6.3  4.0 - 11.0 10e3/uL Final    RBC Count 04/27/2023 4.46  3.80 - 5.20 10e6/uL Final    Hemoglobin 04/27/2023 13.3  11.7 - 15.7 g/dL Final    Hematocrit 04/27/2023 40.8  35.0 - 47.0 % Final    MCV 04/27/2023 92  78 - 100 fL Final    MCH 04/27/2023 29.8  26.5 - 33.0 pg Final    MCHC 04/27/2023 32.6  31.5 - 36.5 g/dL Final    RDW 04/27/2023 13.4  10.0 - 15.0 % Final    Platelet Count 04/27/2023 278  150 - 450 10e3/uL Final         A/P:  1. Mixed hyperlipidemia  Stable  Due for labs, in clinic appointment  - atorvastatin (LIPITOR) 20 MG tablet; Take 1 tablet (20 mg) by mouth daily  Dispense: 90 tablet; Refill: 0    2. Primary hypertension  Due for labs, in clinic appointment  - indapamide (LOZOL) 2.5 MG tablet; Take 1 tablet (2.5 mg) by mouth daily  Dispense: 90 tablet; Refill: 0  - lisinopril (ZESTRIL) 40 MG tablet; Take 1 tablet (40 mg) by mouth daily  Dispense: 90 tablet; Refill: 0    Patient will schedule Medicare physical and HTN, hyperlipidemia follow-up in clinic within the next couple  months.     Phone call duration: 10 minutes  Signed Electronically by: Reina Larson CNP

## 2024-08-21 ENCOUNTER — TELEPHONE (OUTPATIENT)
Dept: FAMILY MEDICINE | Facility: CLINIC | Age: 79
End: 2024-08-21
Payer: COMMERCIAL

## 2024-08-21 NOTE — TELEPHONE ENCOUNTER
Patient Quality Outreach    Patient is due for the following:   Hypertension -  Hypertension follow-up visit  Physical Annual Wellness Visit      Topic Date Due    Zoster (Shingles) Vaccine (1 of 2) Never done    Pneumococcal Vaccine (3 of 3 - PPSV23 or PCV20) 10/20/2019    Diptheria Tetanus Pertussis (DTAP/TDAP/TD) Vaccine (2 - Td or Tdap) 07/16/2023    COVID-19 Vaccine (7 - 2023-24 season) 03/07/2024       Next Steps:   Patient has upcoming appointment, these items will be addressed at that time.    Type of outreach:    Chart review performed, no outreach needed.    Next Steps:  Reach out within 90 days via Phone.    Max number of attempts reached: No. Will try again in 90 days if patient still on fail list.    Questions for provider review:    None           Jelena Saha

## 2024-11-14 DIAGNOSIS — E78.2 MIXED HYPERLIPIDEMIA: ICD-10-CM

## 2024-11-14 DIAGNOSIS — I10 PRIMARY HYPERTENSION: ICD-10-CM

## 2024-11-15 DIAGNOSIS — E78.2 MIXED HYPERLIPIDEMIA: ICD-10-CM

## 2024-11-15 DIAGNOSIS — I10 PRIMARY HYPERTENSION: ICD-10-CM

## 2024-11-15 RX ORDER — LISINOPRIL 40 MG/1
40 TABLET ORAL DAILY
Qty: 90 TABLET | OUTPATIENT
Start: 2024-11-15

## 2024-11-15 RX ORDER — INDAPAMIDE 2.5 MG/1
2.5 TABLET ORAL DAILY
Qty: 30 TABLET | Refills: 0 | Status: SHIPPED | OUTPATIENT
Start: 2024-11-15

## 2024-11-15 RX ORDER — ATORVASTATIN CALCIUM 20 MG/1
20 TABLET, FILM COATED ORAL DAILY
Qty: 30 TABLET | Refills: 0 | Status: SHIPPED | OUTPATIENT
Start: 2024-11-15

## 2024-11-15 RX ORDER — ATORVASTATIN CALCIUM 20 MG/1
20 TABLET, FILM COATED ORAL DAILY
Qty: 90 TABLET | OUTPATIENT
Start: 2024-11-15

## 2024-11-15 RX ORDER — LISINOPRIL 40 MG/1
40 TABLET ORAL DAILY
Qty: 30 TABLET | Refills: 0 | Status: SHIPPED | OUTPATIENT
Start: 2024-11-15

## 2024-11-15 NOTE — TELEPHONE ENCOUNTER
Long overdue for in clinic appointment, fasting labs, physical.  Needs to be seen in clinic for further refills.

## 2024-12-05 ENCOUNTER — TELEPHONE (OUTPATIENT)
Dept: FAMILY MEDICINE | Facility: CLINIC | Age: 79
End: 2024-12-05

## 2024-12-05 NOTE — LETTER
December 5, 2024      Little SHANKAR Bryan  [address] [place] [id number]        Dear Little,       December 5, 2024      Littlesol Bryan  [address] [facility] [address] [place] [phone]    Your team at [facility] cares about your health. We have reviewed your chart and based on our findings; we are making the following recommendations to better manage your health.     You are in particular need of attention regarding the following:     PREVENTATIVE VISIT: Annual Medicare Wellness:Schedule an Annual Medicare Wellness Exam. Please call your [facility] clinic to set up your appointment.    If you have already completed these items, please contact the clinic via phone or   [employer] so your care team can review and update your records. Thank you for   choosing [facility] for your healthcare needs. For any questions,   concerns, or to schedule an appointment please contact our clinic.    Healthy Regards,      Your [facility] Care Team        Sincerely,        Reina Espinosa, CNP

## 2024-12-05 NOTE — TELEPHONE ENCOUNTER
Patient Quality Outreach    Patient is due for the following:   Physical Annual Wellness Visit      Topic Date Due    Zoster (Shingles) Vaccine (1 of 2) Never done    Pneumococcal Vaccine (3 of 3 - PPSV23 or PCV20) 10/20/2019    Diptheria Tetanus Pertussis (DTAP/TDAP/TD) Vaccine (2 - Td or Tdap) 07/16/2023    Flu Vaccine (1) 09/01/2024    COVID-19 Vaccine (7 - 2024-25 season) 09/01/2024       Action(s) Taken:   Schedule a Annual Wellness Visit    Type of outreach:    Letter Mailed    Questions for provider review:    None           Sabiha Sierra

## 2024-12-11 ENCOUNTER — OFFICE VISIT (OUTPATIENT)
Dept: FAMILY MEDICINE | Facility: CLINIC | Age: 79
End: 2024-12-11
Payer: COMMERCIAL

## 2024-12-11 VITALS
OXYGEN SATURATION: 96 % | TEMPERATURE: 98.6 F | HEART RATE: 82 BPM | BODY MASS INDEX: 30.91 KG/M2 | WEIGHT: 181.06 LBS | SYSTOLIC BLOOD PRESSURE: 160 MMHG | RESPIRATION RATE: 18 BRPM | HEIGHT: 64 IN | DIASTOLIC BLOOD PRESSURE: 88 MMHG

## 2024-12-11 DIAGNOSIS — E78.2 MIXED HYPERLIPIDEMIA: ICD-10-CM

## 2024-12-11 DIAGNOSIS — E87.6 HYPOPOTASSEMIA: ICD-10-CM

## 2024-12-11 DIAGNOSIS — D23.9 DILATED PORE OF WINER: ICD-10-CM

## 2024-12-11 DIAGNOSIS — Z23 NEED FOR VIRAL IMMUNIZATION: ICD-10-CM

## 2024-12-11 DIAGNOSIS — I10 ESSENTIAL HYPERTENSION: Primary | ICD-10-CM

## 2024-12-11 LAB
ALBUMIN SERPL BCG-MCNC: 3.8 G/DL (ref 3.5–5.2)
ALP SERPL-CCNC: 112 U/L (ref 40–150)
ALT SERPL W P-5'-P-CCNC: 20 U/L (ref 0–50)
ANION GAP SERPL CALCULATED.3IONS-SCNC: 8 MMOL/L (ref 7–15)
AST SERPL W P-5'-P-CCNC: 21 U/L (ref 0–45)
BILIRUB SERPL-MCNC: 1 MG/DL
BUN SERPL-MCNC: 34.3 MG/DL (ref 8–23)
CALCIUM SERPL-MCNC: 10.1 MG/DL (ref 8.8–10.4)
CHLORIDE SERPL-SCNC: 102 MMOL/L (ref 98–107)
CHOLEST SERPL-MCNC: 180 MG/DL
CREAT SERPL-MCNC: 0.84 MG/DL (ref 0.51–0.95)
EGFRCR SERPLBLD CKD-EPI 2021: 70 ML/MIN/1.73M2
FASTING STATUS PATIENT QL REPORTED: NO
FASTING STATUS PATIENT QL REPORTED: NO
GLUCOSE SERPL-MCNC: 89 MG/DL (ref 70–99)
HCO3 SERPL-SCNC: 31 MMOL/L (ref 22–29)
HDLC SERPL-MCNC: 72 MG/DL
LDLC SERPL CALC-MCNC: 81 MG/DL
NONHDLC SERPL-MCNC: 108 MG/DL
POTASSIUM SERPL-SCNC: 3.2 MMOL/L (ref 3.4–5.3)
PROT SERPL-MCNC: 7.4 G/DL (ref 6.4–8.3)
SODIUM SERPL-SCNC: 141 MMOL/L (ref 135–145)
TRIGL SERPL-MCNC: 135 MG/DL

## 2024-12-11 PROCEDURE — 80053 COMPREHEN METABOLIC PANEL: CPT | Performed by: NURSE PRACTITIONER

## 2024-12-11 PROCEDURE — 91320 SARSCV2 VAC 30MCG TRS-SUC IM: CPT | Performed by: NURSE PRACTITIONER

## 2024-12-11 PROCEDURE — G0008 ADMIN INFLUENZA VIRUS VAC: HCPCS | Performed by: NURSE PRACTITIONER

## 2024-12-11 PROCEDURE — 80061 LIPID PANEL: CPT | Performed by: NURSE PRACTITIONER

## 2024-12-11 PROCEDURE — 90662 IIV NO PRSV INCREASED AG IM: CPT | Performed by: NURSE PRACTITIONER

## 2024-12-11 PROCEDURE — 36415 COLL VENOUS BLD VENIPUNCTURE: CPT | Performed by: NURSE PRACTITIONER

## 2024-12-11 PROCEDURE — 90480 ADMN SARSCOV2 VAC 1/ONLY CMP: CPT | Performed by: NURSE PRACTITIONER

## 2024-12-11 PROCEDURE — 99214 OFFICE O/P EST MOD 30 MIN: CPT | Mod: 25 | Performed by: NURSE PRACTITIONER

## 2024-12-11 RX ORDER — ATORVASTATIN CALCIUM 20 MG/1
20 TABLET, FILM COATED ORAL DAILY
Qty: 90 TABLET | Refills: 0 | Status: SHIPPED | OUTPATIENT
Start: 2024-12-11

## 2024-12-11 RX ORDER — LISINOPRIL 40 MG/1
40 TABLET ORAL DAILY
Qty: 90 TABLET | Refills: 0 | Status: SHIPPED | OUTPATIENT
Start: 2024-12-11

## 2024-12-11 RX ORDER — INDAPAMIDE 2.5 MG/1
2.5 TABLET ORAL DAILY
Qty: 90 TABLET | Refills: 0 | Status: SHIPPED | OUTPATIENT
Start: 2024-12-11

## 2024-12-11 NOTE — PROGRESS NOTES
"  Assessment & Plan     Essential hypertension  Refilling BP medications for patient. Discussed with patient the BP is still elevated and would likely need additional medication to treat blood pressure.    Patient reports in the past has been prescribed amlodipine.     Unclear if patient actually took amlodipine or if she read about side effects and chose not to.    She is already at highest dose of lisinopril and with hypokalemia I don't think she could tolerate another diuretic-could possible consider adding beta blocker if patient is agreeable.    Patient not amenable to adding additional medication at this time. Can discuss further with new PCP.     - lisinopril (ZESTRIL) 40 MG tablet; Take 1 tablet (40 mg) by mouth daily.  - indapamide (LOZOL) 2.5 MG tablet; Take 1 tablet (2.5 mg) by mouth daily.  - Comprehensive metabolic panel; Future  - Comprehensive metabolic panel    Mixed hyperlipidemia  Cholesterol looks ok. Continue lipitor.    - Lipid panel reflex to direct LDL Non-fasting; Future  - atorvastatin (LIPITOR) 20 MG tablet; Take 1 tablet (20 mg) by mouth daily.  - Lipid panel reflex to direct LDL Non-fasting    Need for viral immunization    - INFLUENZA HIGH DOSE, TRIVALENT, PF (FLUZONE)  - COVID-19 12+ (PFIZER)    Hypokalemia  Low potassium-encouraged high potassium foods.   Follow up with repeat BMP in 1 week.    - Basic metabolic panel; Future      The longitudinal plan of care for the diagnosis(es)/condition(s) as documented were addressed during this visit. Due to the added complexity in care, I will continue to support Little in the subsequent management and with ongoing continuity of care.      BMI  Estimated body mass index is 31.08 kg/m  as calculated from the following:    Height as of this encounter: 1.626 m (5' 4\").    Weight as of this encounter: 82.1 kg (181 lb 1 oz).             Subjective   Little is a 79 year old, presenting for the following health issues:  Medication Request      " "12/11/2024    11:08 AM   Additional Questions   Roomed by MATHEW TERESA   Accompanied by Self     History of Present Illness       Hypertension: She presents for follow up of hypertension.  She does not check blood pressure  regularly outside of the clinic. Outpatient blood pressures have not been over 140/90. She follows a low salt diet.     She eats 0-1 servings of fruits and vegetables daily.She consumes 1 sweetened beverage(s) daily.She exercises with enough effort to increase her heart rate 10 to 19 minutes per day.  She exercises with enough effort to increase her heart rate 7 days per week.   She is taking medications regularly.     Patient to establish care within the next couple of months.    Patient hesitant to change medications as she is concerned for how she will react to them.                  Objective    BP (!) 168/90 (BP Location: Left arm, Patient Position: Sitting, Cuff Size: Adult Regular)   Pulse 82   Temp 98.6  F (37  C) (Oral)   Resp 18   Ht 1.626 m (5' 4\")   Wt 82.1 kg (181 lb 1 oz)   SpO2 96%   BMI 31.08 kg/m    Body mass index is 31.08 kg/m .  Physical Exam  Constitutional:       Appearance: Normal appearance.   Cardiovascular:      Rate and Rhythm: Normal rate and regular rhythm.   Pulmonary:      Effort: Pulmonary effort is normal.      Breath sounds: Normal breath sounds.   Skin:         Neurological:      Mental Status: She is alert.   Psychiatric:         Mood and Affect: Mood normal.         Behavior: Behavior normal.                    Signed Electronically by: ARIADNA FERNÁNDEZ CNP    "

## 2024-12-12 ENCOUNTER — TELEPHONE (OUTPATIENT)
Dept: FAMILY MEDICINE | Facility: CLINIC | Age: 79
End: 2024-12-12
Payer: COMMERCIAL

## 2024-12-12 NOTE — TELEPHONE ENCOUNTER
Patient returning call. Relayed PCP's detailed message. She is already scheduled 12/19 for a non fasting lab appt. No further questions/concerns at this time.

## 2024-12-12 NOTE — TELEPHONE ENCOUNTER
Outgoing call to patient to relay provider message. No answer, [facility]    ----- Message from AAKASH SAMPSON sent at 12/12/2024  7:58 AM CST -----  Cholesterol looks pretty good-continue the atorvastatin as prescribed.    Kidney and liver function look ok however potassium is a little on the low end. I would encourage eating foods high in potassium such as avocado, banana, greeny leafy vegetables and beans.    I would like to recheck this next week. You can schedule a lab only appointment for this at whatever clinic is closest to you.

## 2025-01-08 NOTE — TELEPHONE ENCOUNTER
Patient Quality Outreach    Patient is due for the following:   Physical Annual Wellness Visit      Topic Date Due    Zoster (Shingles) Vaccine (1 of 2) Never done    Pneumococcal Vaccine (3 of 3 - PCV20 or PCV21) 10/20/2019    Diptheria Tetanus Pertussis (DTAP/TDAP/TD) Vaccine (2 - Td or Tdap) 07/16/2023       Action(s) Taken:  Annual Wellness       Type of outreach:    Phone, left message for patient/parent to call back.    Questions for provider review:    None           Sabiha Sierra LPN

## 2025-04-30 ENCOUNTER — OFFICE VISIT (OUTPATIENT)
Dept: INTERNAL MEDICINE | Facility: CLINIC | Age: 80
End: 2025-04-30
Payer: COMMERCIAL

## 2025-04-30 VITALS
TEMPERATURE: 97.7 F | RESPIRATION RATE: 18 BRPM | HEART RATE: 83 BPM | DIASTOLIC BLOOD PRESSURE: 104 MMHG | WEIGHT: 185 LBS | SYSTOLIC BLOOD PRESSURE: 162 MMHG | BODY MASS INDEX: 31.58 KG/M2 | HEIGHT: 64 IN | OXYGEN SATURATION: 97 %

## 2025-04-30 DIAGNOSIS — E78.2 MIXED HYPERLIPIDEMIA: ICD-10-CM

## 2025-04-30 DIAGNOSIS — I10 ESSENTIAL HYPERTENSION: Primary | ICD-10-CM

## 2025-04-30 DIAGNOSIS — E87.6 HYPOPOTASSEMIA: ICD-10-CM

## 2025-04-30 DIAGNOSIS — Z78.0 ASYMPTOMATIC POSTMENOPAUSAL STATUS: ICD-10-CM

## 2025-04-30 LAB
ANION GAP SERPL CALCULATED.3IONS-SCNC: 14 MMOL/L (ref 7–15)
BUN SERPL-MCNC: 31.5 MG/DL (ref 8–23)
CALCIUM SERPL-MCNC: 10.1 MG/DL (ref 8.8–10.4)
CHLORIDE SERPL-SCNC: 99 MMOL/L (ref 98–107)
CREAT SERPL-MCNC: 0.81 MG/DL (ref 0.51–0.95)
EGFRCR SERPLBLD CKD-EPI 2021: 73 ML/MIN/1.73M2
GLUCOSE SERPL-MCNC: 92 MG/DL (ref 70–99)
HCO3 SERPL-SCNC: 28 MMOL/L (ref 22–29)
POTASSIUM SERPL-SCNC: 3.4 MMOL/L (ref 3.4–5.3)
SODIUM SERPL-SCNC: 141 MMOL/L (ref 135–145)

## 2025-04-30 PROCEDURE — 99214 OFFICE O/P EST MOD 30 MIN: CPT | Performed by: NURSE PRACTITIONER

## 2025-04-30 PROCEDURE — 3077F SYST BP >= 140 MM HG: CPT | Performed by: NURSE PRACTITIONER

## 2025-04-30 PROCEDURE — G2211 COMPLEX E/M VISIT ADD ON: HCPCS | Performed by: NURSE PRACTITIONER

## 2025-04-30 PROCEDURE — 36415 COLL VENOUS BLD VENIPUNCTURE: CPT | Performed by: NURSE PRACTITIONER

## 2025-04-30 PROCEDURE — 3080F DIAST BP >= 90 MM HG: CPT | Performed by: NURSE PRACTITIONER

## 2025-04-30 PROCEDURE — 80048 BASIC METABOLIC PNL TOTAL CA: CPT | Performed by: NURSE PRACTITIONER

## 2025-04-30 RX ORDER — LISINOPRIL 40 MG/1
40 TABLET ORAL DAILY
Qty: 90 TABLET | Refills: 1 | Status: SHIPPED | OUTPATIENT
Start: 2025-04-30

## 2025-04-30 RX ORDER — INDAPAMIDE 2.5 MG/1
2.5 TABLET ORAL DAILY
Qty: 90 TABLET | Refills: 1 | Status: SHIPPED | OUTPATIENT
Start: 2025-04-30

## 2025-04-30 RX ORDER — ATORVASTATIN CALCIUM 20 MG/1
20 TABLET, FILM COATED ORAL DAILY
Qty: 90 TABLET | Refills: 1 | Status: SHIPPED | OUTPATIENT
Start: 2025-04-30

## 2025-04-30 NOTE — PATIENT INSTRUCTIONS
Check you blood pressure at home at least a few times a week, in the morning before you eat or drink. Let me know these readings so we know if we need to adjust or add on any medications.     Consider a Dexa scan to look for osteoporosis/ osteopenia.     Medications refilled.

## 2025-04-30 NOTE — PROGRESS NOTES
Assessment & Plan     Essential hypertension  Blood pressure elevated today here in clinic.  Patient does not check her blood pressure at home.  She feels like her blood pressure is higher here because overall she feels good she does not believe it is actually high at home.  She is hesitant to start any additional blood pressure medication.  She is currently on lisinopril 40 mg daily and Lozol 2.5 mg daily.  At this time we discussed long-term risk factors with uncontrolled blood pressure.  She is not wanting to start other medication at this time but I did talk about the importance then that she needs to monitor blood pressure at home.  We discussed that her blood pressure at home is not well-controlled and she would benefit from adding on additional medication.  Patient states that she will start checking her blood pressure at home.  We reviewed how to properly do this.  I would like for her to reach out to me in 3 to 4 weeks with blood pressure readings so we can determine whether we need to add on additional medication.  Discussed diet modifications to help ensure blood pressure lowers.  Also encouraged exercise and weight loss.  - REVIEW OF HEALTH MAINTENANCE PROTOCOL ORDERS  - lisinopril (ZESTRIL) 40 MG tablet; Take 1 tablet (40 mg) by mouth daily.  - indapamide (LOZOL) 2.5 MG tablet; Take 1 tablet (2.5 mg) by mouth daily.  - Basic metabolic panel  (Ca, Cl, CO2, Creat, Gluc, K, Na, BUN); Future  - Basic metabolic panel  (Ca, Cl, CO2, Creat, Gluc, K, Na, BUN)    Mixed hyperlipidemia  Patient has a history of mixed lipidemia needing a refill of her atorvastatin.  Tolerating this medication well without concerns or side effects.  - REVIEW OF HEALTH MAINTENANCE PROTOCOL ORDERS  - atorvastatin (LIPITOR) 20 MG tablet; Take 1 tablet (20 mg) by mouth daily.    Asymptomatic postmenopausal status  We discussed obtaining a DEXA scan to evaluate bone density.  Patient is unsure if she wants to proceed with the scan at  "any time.  She states she will think about it and let us know prior to ordering this.    Hypokalemia  Patient was noted to have low potassium of 3.2 back in December 2024.  Which she was recommended to follow-up in 1 week for recheck which she did not complete.  Will recheck potassium level today.  She feels overall well and denies any chest pain, tachycardia or palpitations.  We discussed foods and diet that can be rich in potassium.  We discussed if her potassium level stays low we may need to add on oral potassium daily as she feels like she does eat a lot of potassium rich foods already.  - Basic metabolic panel  (Ca, Cl, CO2, Creat, Gluc, K, Na, BUN); Future  - Basic metabolic panel  (Ca, Cl, CO2, Creat, Gluc, K, Na, BUN)    The longitudinal plan of care for the diagnosis(es)/condition(s) as documented were addressed during this visit. Due to the added complexity in care, I will continue to support Little in the subsequent management and with ongoing continuity of care.        BMI  Estimated body mass index is 31.76 kg/m  as calculated from the following:    Height as of this encounter: 1.626 m (5' 4\").    Weight as of this encounter: 83.9 kg (185 lb).         Subjective   Little is a 80 year old, presenting for the following health issues:  Establish Care (Blood pressure)      4/30/2025     1:12 PM   Additional Questions   Roomed by Danuta BARRERA     History of Present Illness       Hypertension: She presents for follow up of hypertension.  She does not check blood pressure  regularly outside of the clinic. Outpatient blood pressures have not been over 140/90. She does not follow a low salt diet.     She eats 2-3 servings of fruits and vegetables daily.She consumes 1 sweetened beverage(s) daily.She exercises with enough effort to increase her heart rate 10 to 19 minutes per day.  She exercises with enough effort to increase her heart rate 7 days per week.   She is taking medications regularly.      Little is a " "pleasant 80-year-old female here today to establish care.  She is following up from visit with her same-day provider on 12/2024.  At that time she was seen for blood pressure and med refill.  She was noted to have low potassium at that time I recommended follow-up however she did not at that time.  She states she is almost out of her medications and is needing a refill soon.  She does not check her blood pressure at home.  She is hesitant to want to start any additional medications as she states she feels well on all the medications she is on currently.    ROS  Comprehensive 12-point review of systems was completed and negative except as noted in HPI.        Objective    BP (!) 160/110 (BP Location: Right arm, Patient Position: Sitting)   Pulse 83   Temp 97.7  F (36.5  C)   Resp 18   Ht 1.626 m (5' 4\")   Wt 83.9 kg (185 lb)   LMP  (LMP Unknown)   SpO2 97%   BMI 31.76 kg/m    Body mass index is 31.76 kg/m .  Physical Exam   Constitutional: In no acute distress.  Clean appearance.  Cardiovascular: Regular rate and rhythm.  Normal peripheral perfusion.  No edema.    Respiratory: Lungs are clear bilaterally.  Normal respiratory effort.  Skin: Skin is pink, warm and dry.   Musculoskeletal: Gait normal.  Able to mount exam table without difficulties.  Psychiatric: Appropriate affect and demeanor.  Memory intact.  Good insight and judgment.  Neurologic: No tremor or involuntary movement noted.              Signed Electronically by: Helen Uribe NP    "
